# Patient Record
Sex: FEMALE | Race: WHITE | NOT HISPANIC OR LATINO
[De-identification: names, ages, dates, MRNs, and addresses within clinical notes are randomized per-mention and may not be internally consistent; named-entity substitution may affect disease eponyms.]

---

## 2017-12-22 ENCOUNTER — TRANSCRIPTION ENCOUNTER (OUTPATIENT)
Age: 27
End: 2017-12-22

## 2018-06-18 ENCOUNTER — TRANSCRIPTION ENCOUNTER (OUTPATIENT)
Age: 28
End: 2018-06-18

## 2019-03-11 ENCOUNTER — TRANSCRIPTION ENCOUNTER (OUTPATIENT)
Age: 29
End: 2019-03-11

## 2019-06-28 ENCOUNTER — TRANSCRIPTION ENCOUNTER (OUTPATIENT)
Age: 29
End: 2019-06-28

## 2019-07-10 ENCOUNTER — TRANSCRIPTION ENCOUNTER (OUTPATIENT)
Age: 29
End: 2019-07-10

## 2019-08-06 ENCOUNTER — APPOINTMENT (OUTPATIENT)
Dept: OBGYN | Facility: CLINIC | Age: 29
End: 2019-08-06
Payer: COMMERCIAL

## 2019-08-06 ENCOUNTER — RECORD ABSTRACTING (OUTPATIENT)
Age: 29
End: 2019-08-06

## 2019-08-06 VITALS
HEIGHT: 66 IN | WEIGHT: 157 LBS | SYSTOLIC BLOOD PRESSURE: 110 MMHG | BODY MASS INDEX: 25.23 KG/M2 | DIASTOLIC BLOOD PRESSURE: 68 MMHG

## 2019-08-06 DIAGNOSIS — F41.9 ANXIETY DISORDER, UNSPECIFIED: ICD-10-CM

## 2019-08-06 DIAGNOSIS — Z82.62 FAMILY HISTORY OF OSTEOPOROSIS: ICD-10-CM

## 2019-08-06 DIAGNOSIS — Z80.52 FAMILY HISTORY OF MALIGNANT NEOPLASM OF BLADDER: ICD-10-CM

## 2019-08-06 DIAGNOSIS — L98.9 DISORDER OF THE SKIN AND SUBCUTANEOUS TISSUE, UNSPECIFIED: ICD-10-CM

## 2019-08-06 DIAGNOSIS — Z00.00 ENCOUNTER FOR GENERAL ADULT MEDICAL EXAMINATION W/OUT ABNORMAL FINDINGS: ICD-10-CM

## 2019-08-06 DIAGNOSIS — F39 UNSPECIFIED MOOD [AFFECTIVE] DISORDER: ICD-10-CM

## 2019-08-06 DIAGNOSIS — Z87.42 PERSONAL HISTORY OF OTHER DISEASES OF THE FEMALE GENITAL TRACT: ICD-10-CM

## 2019-08-06 DIAGNOSIS — Z82.49 FAMILY HISTORY OF ISCHEMIC HEART DISEASE AND OTHER DISEASES OF THE CIRCULATORY SYSTEM: ICD-10-CM

## 2019-08-06 DIAGNOSIS — J45.909 UNSPECIFIED ASTHMA, UNCOMPLICATED: ICD-10-CM

## 2019-08-06 DIAGNOSIS — Z78.9 OTHER SPECIFIED HEALTH STATUS: ICD-10-CM

## 2019-08-06 DIAGNOSIS — Z30.09 ENCOUNTER FOR OTHER GENERAL COUNSELING AND ADVICE ON CONTRACEPTION: ICD-10-CM

## 2019-08-06 DIAGNOSIS — Z80.0 FAMILY HISTORY OF MALIGNANT NEOPLASM OF DIGESTIVE ORGANS: ICD-10-CM

## 2019-08-06 DIAGNOSIS — Z01.419 ENCOUNTER FOR GYNECOLOGICAL EXAMINATION (GENERAL) (ROUTINE) W/OUT ABNORMAL FINDINGS: ICD-10-CM

## 2019-08-06 LAB — CYTOLOGY CVX/VAG DOC THIN PREP: NORMAL

## 2019-08-06 PROCEDURE — 99395 PREV VISIT EST AGE 18-39: CPT

## 2019-08-06 RX ORDER — FEXOFENADINE HYDROCHLORIDE 180 MG/1
180 TABLET, FILM COATED ORAL
Refills: 0 | Status: DISCONTINUED | COMMUNITY
End: 2019-08-06

## 2019-08-06 RX ORDER — NORETHINDRONE ACETATE/ETHINYL ESTRADIOL AND FERROUS FUMARATE 1MG-20(21)
1-20 KIT ORAL
Refills: 0 | Status: DISCONTINUED | COMMUNITY
End: 2019-08-06

## 2019-08-10 LAB — CYTOLOGY CVX/VAG DOC THIN PREP: NORMAL

## 2019-08-12 NOTE — HISTORY OF PRESENT ILLNESS
[___ Year(s) Ago] : [unfilled] year(s) ago [Good] : being in good health [Healthy Diet] : a healthy diet [Regular Exercise] : regular exercise [Last Pap ___] : Last cervical pap smear was [unfilled] [Reproductive Age] : is of reproductive age [Contraception] : uses contraception [Up to Date] : up to date with ~his/her~ STD screening [Last Screen ___] : last STD screen was [unfilled] [Sexually Active] : is sexually active [Monogamous] : is monogamous [Male ___] : [unfilled] male [Oral Contraceptive] : uses oral contraception pills [Definite:  ___ (Date)] : the last menstrual period was [unfilled] [Normal Amount/Duration] : was of a normal amount and duration [Regular Cycle Intervals] : periods have been regular [Frequency: Q ___ days] : menstrual periods occur approximately every [unfilled] days [Menarche Age: ____] : age at menarche was [unfilled] [Prior Menses:  ___ Date] : date of prior menstruation was [unfilled] [Weight Concerns] : no concerns with her weight [Menstrual Problems] : reports normal menses [Pregnancy History] : denies prior pregnancies [de-identified] : ALEN 11/02/2017 NEG [Spotting Between  Menses] : no spotting between menses

## 2019-08-12 NOTE — DISCUSSION/SUMMARY
[Combined Oral Contraception] : combined oral contraception [Pregnancy Prevention] : pregnancy prevention [FreeTextEntry1] : The R/B/C of OBC's were reviewed. All the pt's questions and concerns were addressed.

## 2020-04-20 ENCOUNTER — RX RENEWAL (OUTPATIENT)
Age: 30
End: 2020-04-20

## 2020-12-21 PROBLEM — Z01.419 ENCOUNTER FOR ANNUAL ROUTINE GYNECOLOGICAL EXAMINATION: Status: RESOLVED | Noted: 2019-08-06 | Resolved: 2020-12-21

## 2021-12-28 ENCOUNTER — NON-APPOINTMENT (OUTPATIENT)
Age: 31
End: 2021-12-28

## 2022-01-04 ENCOUNTER — APPOINTMENT (OUTPATIENT)
Dept: OBGYN | Facility: CLINIC | Age: 32
End: 2022-01-04
Payer: COMMERCIAL

## 2022-01-04 VITALS
WEIGHT: 176 LBS | DIASTOLIC BLOOD PRESSURE: 78 MMHG | BODY MASS INDEX: 28.28 KG/M2 | SYSTOLIC BLOOD PRESSURE: 120 MMHG | HEIGHT: 66 IN

## 2022-01-04 PROCEDURE — 99213 OFFICE O/P EST LOW 20 MIN: CPT

## 2022-01-04 PROCEDURE — 81025 URINE PREGNANCY TEST: CPT

## 2022-01-04 PROCEDURE — 36415 COLL VENOUS BLD VENIPUNCTURE: CPT

## 2022-01-04 RX ORDER — LEVONORGESTREL AND ETHINYL ESTRADIOL 0.1-0.02MG
0.1-2 KIT ORAL
Qty: 3 | Refills: 3 | Status: DISCONTINUED | COMMUNITY
Start: 2019-08-06 | End: 2022-01-04

## 2022-01-04 RX ORDER — LEVONORGESTREL AND ETHINYL ESTRADIOL 0.1-0.02MG
0.1-2 KIT ORAL
Qty: 84 | Refills: 1 | Status: DISCONTINUED | COMMUNITY
Start: 2020-04-20 | End: 2022-01-04

## 2022-01-04 RX ORDER — LEVONORGESTREL AND ETHINYL ESTRADIOL 0.1-0.02MG
0.1-2 KIT ORAL
Refills: 0 | Status: DISCONTINUED | COMMUNITY
End: 2022-01-04

## 2022-01-04 NOTE — PLAN
[FreeTextEntry1] : Advised the patient to have an hCG today and return for pelvic sonogram and annual exam- no ultrasound on site tonight\par Importance of trending hCG to negative reviewed with patient\par Warning signs for immediate attention reviewed\par Bleeding call parameters reviewed\par All questions answered\par Patient advised on the benefits of waiting 1 cycle after MAB prior to attempting conception\par Prenatal vitamin benefits 6 weeks preconception also reviewed

## 2022-01-04 NOTE — HISTORY OF PRESENT ILLNESS
[N] : Patient does not use contraception [Y] : Positive pregnancy history [Menarche Age: ____] : age at menarche was [unfilled] [No] : Patient does not have concerns regarding sex [PapSmeardate] : 8/6/19 [TextBox_31] : NEG [LMPDate] : 11/9/21 [PGxTotal] : 1 [FreeTextEntry1] : 11/9/21

## 2022-01-05 ENCOUNTER — NON-APPOINTMENT (OUTPATIENT)
Age: 32
End: 2022-01-05

## 2022-01-06 ENCOUNTER — TRANSCRIPTION ENCOUNTER (OUTPATIENT)
Age: 32
End: 2022-01-06

## 2022-01-06 ENCOUNTER — APPOINTMENT (OUTPATIENT)
Dept: OBGYN | Facility: CLINIC | Age: 32
End: 2022-01-06
Payer: COMMERCIAL

## 2022-01-06 ENCOUNTER — ASOB RESULT (OUTPATIENT)
Age: 32
End: 2022-01-06

## 2022-01-06 ENCOUNTER — APPOINTMENT (OUTPATIENT)
Dept: ANTEPARTUM | Facility: CLINIC | Age: 32
End: 2022-01-06
Payer: COMMERCIAL

## 2022-01-06 VITALS
BODY MASS INDEX: 27.8 KG/M2 | WEIGHT: 173 LBS | HEIGHT: 66 IN | DIASTOLIC BLOOD PRESSURE: 66 MMHG | SYSTOLIC BLOOD PRESSURE: 124 MMHG

## 2022-01-06 DIAGNOSIS — O20.9 HEMORRHAGE IN EARLY PREGNANCY, UNSPECIFIED: ICD-10-CM

## 2022-01-06 PROCEDURE — 99213 OFFICE O/P EST LOW 20 MIN: CPT

## 2022-01-06 PROCEDURE — 76817 TRANSVAGINAL US OBSTETRIC: CPT

## 2022-01-08 LAB
ABO + RH PNL BLD: NORMAL
BLD GP AB SCN SERPL QL: NORMAL
HCG SERPL-MCNC: ABNORMAL MIU/ML
HCG UR QL: POSITIVE
HCG-TM SERPL-MCNC: ABNORMAL MIU/ML
QUALITY CONTROL: YES

## 2022-01-09 NOTE — PHYSICAL EXAM
[Chaperone Present] : A chaperone was present in the examining room during all aspects of the physical examination [Appropriately responsive] : appropriately responsive [Alert] : alert [No Acute Distress] : no acute distress [Oriented x3] : oriented x3 [Labia Majora] : normal [Labia Minora] : normal [Bartholin's Gland] : the left Bartholin's gland was normal [Scant] : There was scant vaginal bleeding [Normal] : normal [Uterine Adnexae] : normal [FreeTextEntry1] : DONAL Wyatt [FreeTextEntry4] : Brown vaginal blood seen.

## 2022-01-09 NOTE — DISCUSSION/SUMMARY
[FreeTextEntry1] : -Confirmation of pregnancy today- LMP: 11/9/21. EDC by today: 8/16/22.\par 1) Pelvic sono today: CRL 8 1/7 wks.  bpm. CL 2.84 cm. Retroverted uterus with a normal GS. A bleed noted right lateral to GS measuring 3.2 cm. Left ovary WNL. Right ovary corpus luteum cyst 2.7 cm. Cervix appears long and closed. Results were discussed.\par 2) Repeat pelvic sonogram ordered. \par 3) Beta- HCG on 1/4/22: 17029. \par 4) Call parameters were discussed.\par \par -She will follow up in 1 week with pelvic sonogram or as needed.

## 2022-01-09 NOTE — HISTORY OF PRESENT ILLNESS
[PapSmeardate] : 8/6/19 [TextBox_31] : NEG [PGHxTotal] : 2 [LMPDate] : 11/9/21 [[facility]] : [facility] [[facility]] : 1 [FreeTextEntry1] : 11/9/21

## 2022-01-09 NOTE — END OF VISIT
[FreeTextEntry3] : I, Sadeshaan Murilloador solely acted as a scribe for Dr. Gissel Recinos on 01/06/2022 . All medical entries made by the scribe were at my, Dr. Recinos's, direction and personally dictated by me on 01/06/2022 . I have reviewed the chart and agree that the record accurately reflects my personal performance of the history, physical exam, assessment and plan. I have also personally directed, reviewed, and agreed with the chart.

## 2022-01-12 ENCOUNTER — APPOINTMENT (OUTPATIENT)
Dept: OBGYN | Facility: CLINIC | Age: 32
End: 2022-01-12
Payer: COMMERCIAL

## 2022-01-12 ENCOUNTER — APPOINTMENT (OUTPATIENT)
Dept: ANTEPARTUM | Facility: CLINIC | Age: 32
End: 2022-01-12
Payer: COMMERCIAL

## 2022-01-12 ENCOUNTER — ASOB RESULT (OUTPATIENT)
Age: 32
End: 2022-01-12

## 2022-01-12 VITALS
HEIGHT: 66 IN | SYSTOLIC BLOOD PRESSURE: 118 MMHG | WEIGHT: 172 LBS | DIASTOLIC BLOOD PRESSURE: 70 MMHG | BODY MASS INDEX: 27.64 KG/M2

## 2022-01-12 DIAGNOSIS — Z34.90 ENCOUNTER FOR SUPERVISION OF NORMAL PREGNANCY, UNSPECIFIED, UNSPECIFIED TRIMESTER: ICD-10-CM

## 2022-01-12 DIAGNOSIS — Z12.4 ENCOUNTER FOR SCREENING FOR MALIGNANT NEOPLASM OF CERVIX: ICD-10-CM

## 2022-01-12 DIAGNOSIS — Z32.01 ENCOUNTER FOR PREGNANCY TEST, RESULT POSITIVE: ICD-10-CM

## 2022-01-12 LAB
BILIRUB UR QL STRIP: NORMAL
GLUCOSE UR-MCNC: NORMAL
HCG UR QL: 0.2 EU/DL
HGB UR QL STRIP.AUTO: NORMAL
KETONES UR-MCNC: NORMAL
LEUKOCYTE ESTERASE UR QL STRIP: NORMAL
NITRITE UR QL STRIP: NORMAL
PH UR STRIP: 6
PROT UR STRIP-MCNC: NORMAL
SP GR UR STRIP: 1.01

## 2022-01-12 PROCEDURE — 0500F INITIAL PRENATAL CARE VISIT: CPT

## 2022-01-12 PROCEDURE — 76817 TRANSVAGINAL US OBSTETRIC: CPT

## 2022-01-12 PROCEDURE — 36415 COLL VENOUS BLD VENIPUNCTURE: CPT

## 2022-01-13 ENCOUNTER — APPOINTMENT (OUTPATIENT)
Dept: OBGYN | Facility: CLINIC | Age: 32
End: 2022-01-13

## 2022-01-13 ENCOUNTER — APPOINTMENT (OUTPATIENT)
Dept: ANTEPARTUM | Facility: CLINIC | Age: 32
End: 2022-01-13

## 2022-01-17 ENCOUNTER — NON-APPOINTMENT (OUTPATIENT)
Age: 32
End: 2022-01-17

## 2022-01-18 LAB
ABO + RH PNL BLD: NORMAL
ALBUMIN SERPL ELPH-MCNC: 4.6 G/DL
ALP BLD-CCNC: 57 U/L
ALT SERPL-CCNC: 73 U/L
AST SERPL-CCNC: 37 U/L
B19V IGG SER QL IA: 0.27 INDEX
B19V IGG+IGM SER-IMP: NEGATIVE
B19V IGG+IGM SER-IMP: NORMAL
B19V IGM FLD-ACNC: 0.11 INDEX
B19V IGM SER-ACNC: NEGATIVE
BASOPHILS # BLD AUTO: 0.06 K/UL
BASOPHILS NFR BLD AUTO: 0.6 %
BILIRUB DIRECT SERPL-MCNC: 0.2 MG/DL
BILIRUB INDIRECT SERPL-MCNC: 0.5 MG/DL
BILIRUB SERPL-MCNC: 0.7 MG/DL
BLD GP AB SCN SERPL QL: NORMAL
C TRACH RRNA SPEC QL NAA+PROBE: NOT DETECTED
CMV IGG SERPL QL: 0.41 U/ML
CMV IGG SERPL-IMP: NEGATIVE
CMV IGM SERPL QL: <8 AU/ML
CMV IGM SERPL QL: NEGATIVE
CYTOLOGY CVX/VAG DOC THIN PREP: ABNORMAL
EOSINOPHIL # BLD AUTO: 0.25 K/UL
EOSINOPHIL NFR BLD AUTO: 2.5 %
ESTIMATED AVERAGE GLUCOSE: 105 MG/DL
FERRITIN SERPL-MCNC: 108 NG/ML
FOLATE SERPL-MCNC: 14.6 NG/ML
HBA1C MFR BLD HPLC: 5.3 %
HBV SURFACE AG SER QL: NONREACTIVE
HCT VFR BLD CALC: 37.5 %
HGB A MFR BLD: 97.4 %
HGB A2 MFR BLD: 2.6 %
HGB BLD-MCNC: 12.1 G/DL
HGB FRACT BLD-IMP: NORMAL
HIV1+2 AB SPEC QL IA.RAPID: NONREACTIVE
IMM GRANULOCYTES NFR BLD AUTO: 0.2 %
IRON SATN MFR SERPL: 39 %
IRON SERPL-MCNC: 126 UG/DL
LYMPHOCYTES # BLD AUTO: 2.15 K/UL
LYMPHOCYTES NFR BLD AUTO: 21.8 %
M TB IFN-G BLD-IMP: NEGATIVE
MAN DIFF?: NORMAL
MCHC RBC-ENTMCNC: 29.2 PG
MCHC RBC-ENTMCNC: 32.3 GM/DL
MCV RBC AUTO: 90.4 FL
MEV IGG FLD QL IA: 91.2 AU/ML
MEV IGG+IGM SER-IMP: POSITIVE
MONOCYTES # BLD AUTO: 0.79 K/UL
MONOCYTES NFR BLD AUTO: 8 %
N GONORRHOEA RRNA SPEC QL NAA+PROBE: NOT DETECTED
NEUTROPHILS # BLD AUTO: 6.6 K/UL
NEUTROPHILS NFR BLD AUTO: 66.9 %
PLATELET # BLD AUTO: 359 K/UL
PROT SERPL-MCNC: 7 G/DL
QUANTIFERON TB PLUS MITOGEN MINUS NIL: 9.85 IU/ML
QUANTIFERON TB PLUS NIL: 0.01 IU/ML
QUANTIFERON TB PLUS TB1 MINUS NIL: 0 IU/ML
QUANTIFERON TB PLUS TB2 MINUS NIL: 0 IU/ML
RBC # BLD: 4.15 M/UL
RBC # BLD: 4.15 M/UL
RBC # FLD: 12.4 %
RETICS # AUTO: 1.6 %
RETICS AGGREG/RBC NFR: 66.4 K/UL
RUBV IGG FLD-ACNC: 31.5 INDEX
RUBV IGG SER-IMP: POSITIVE
SOURCE TP AMPLIFICATION: NORMAL
T GONDII AB SER-IMP: NEGATIVE
T GONDII IGM SER QL: <3 AU/ML
T PALLIDUM AB SER QL IA: NEGATIVE
TIBC SERPL-MCNC: 326 UG/DL
TSH SERPL-ACNC: 0.54 UIU/ML
UIBC SERPL-MCNC: 200 UG/DL
VIT B12 SERPL-MCNC: 696 PG/ML
WBC # FLD AUTO: 9.87 K/UL

## 2022-01-19 ENCOUNTER — NON-APPOINTMENT (OUTPATIENT)
Age: 32
End: 2022-01-19

## 2022-01-27 ENCOUNTER — APPOINTMENT (OUTPATIENT)
Dept: OBGYN | Facility: CLINIC | Age: 32
End: 2022-01-27

## 2022-02-02 ENCOUNTER — APPOINTMENT (OUTPATIENT)
Dept: OBGYN | Facility: CLINIC | Age: 32
End: 2022-02-02
Payer: COMMERCIAL

## 2022-02-02 ENCOUNTER — NON-APPOINTMENT (OUTPATIENT)
Age: 32
End: 2022-02-02

## 2022-02-02 ENCOUNTER — ASOB RESULT (OUTPATIENT)
Age: 32
End: 2022-02-02

## 2022-02-02 ENCOUNTER — APPOINTMENT (OUTPATIENT)
Dept: ANTEPARTUM | Facility: CLINIC | Age: 32
End: 2022-02-02
Payer: COMMERCIAL

## 2022-02-02 VITALS
SYSTOLIC BLOOD PRESSURE: 126 MMHG | HEIGHT: 66 IN | WEIGHT: 175 LBS | DIASTOLIC BLOOD PRESSURE: 76 MMHG | BODY MASS INDEX: 28.12 KG/M2

## 2022-02-02 DIAGNOSIS — O41.8X10 OTHER SPECIFIED DISORDERS OF AMNIOTIC FLUID AND MEMBRANES, FIRST TRIMESTER, NOT APPLICABLE OR UNSPECIFIED: ICD-10-CM

## 2022-02-02 DIAGNOSIS — O46.8X1 OTHER SPECIFIED DISORDERS OF AMNIOTIC FLUID AND MEMBRANES, FIRST TRIMESTER, NOT APPLICABLE OR UNSPECIFIED: ICD-10-CM

## 2022-02-02 DIAGNOSIS — O34.539 MATERNAL CARE FOR RETROVERSION OF GRAVID UTERUS, UNSPECIFIED TRIMESTER: ICD-10-CM

## 2022-02-02 PROCEDURE — 76801 OB US < 14 WKS SINGLE FETUS: CPT

## 2022-02-02 PROCEDURE — 0502F SUBSEQUENT PRENATAL CARE: CPT

## 2022-02-09 ENCOUNTER — ASOB RESULT (OUTPATIENT)
Age: 32
End: 2022-02-09

## 2022-02-09 ENCOUNTER — APPOINTMENT (OUTPATIENT)
Dept: OBGYN | Facility: CLINIC | Age: 32
End: 2022-02-09
Payer: COMMERCIAL

## 2022-02-09 ENCOUNTER — APPOINTMENT (OUTPATIENT)
Dept: ANTEPARTUM | Facility: CLINIC | Age: 32
End: 2022-02-09
Payer: COMMERCIAL

## 2022-02-09 VITALS
SYSTOLIC BLOOD PRESSURE: 102 MMHG | WEIGHT: 174 LBS | DIASTOLIC BLOOD PRESSURE: 62 MMHG | BODY MASS INDEX: 27.97 KG/M2 | HEIGHT: 66 IN

## 2022-02-09 DIAGNOSIS — Z34.91 ENCOUNTER FOR SUPERVISION OF NORMAL PREGNANCY, UNSPECIFIED, FIRST TRIMESTER: ICD-10-CM

## 2022-02-09 LAB
BILIRUB UR QL STRIP: NORMAL
COLLECTION METHOD: NORMAL
GLUCOSE UR-MCNC: NORMAL
HCG UR QL: 0.2 EU/DL
HGB UR QL STRIP.AUTO: NORMAL
KETONES UR-MCNC: NORMAL
LEUKOCYTE ESTERASE UR QL STRIP: ABNORMAL
NITRITE UR QL STRIP: NORMAL
PH UR STRIP: 6
PROT UR STRIP-MCNC: NORMAL
SP GR UR STRIP: 1

## 2022-02-09 PROCEDURE — 76813 OB US NUCHAL MEAS 1 GEST: CPT

## 2022-02-09 PROCEDURE — 0502F SUBSEQUENT PRENATAL CARE: CPT

## 2022-02-10 PROBLEM — Z34.91 FIRST TRIMESTER PREGNANCY: Status: ACTIVE | Noted: 2022-02-02

## 2022-02-11 ENCOUNTER — NON-APPOINTMENT (OUTPATIENT)
Age: 32
End: 2022-02-11

## 2022-02-14 ENCOUNTER — NON-APPOINTMENT (OUTPATIENT)
Age: 32
End: 2022-02-14

## 2022-02-14 LAB
CLARI ADDITIONAL INFO: NORMAL
CLARI CHROMOSOME 13: NORMAL
CLARI CHROMOSOME 18: NORMAL
CLARI CHROMOSOME 21: NORMAL
CLARI SEX CHROMOSOMES: NORMAL
CLARITEST NIPT: NORMAL
MATERNAL WEIGHT (LBS):: 175
PLEASE INCLUDE GENDER RESULTS ON THIS REPORT:: NORMAL
TYPE OF PREGNANCY:: NORMAL

## 2022-02-15 ENCOUNTER — NON-APPOINTMENT (OUTPATIENT)
Age: 32
End: 2022-02-15

## 2022-02-27 LAB
AR GENE MUT ANL BLD/T: NORMAL
CFTR MUT TESTED BLD/T: NEGATIVE
FMR1 GENE MUT ANL BLD/T: NORMAL

## 2022-03-02 ENCOUNTER — APPOINTMENT (OUTPATIENT)
Dept: OBGYN | Facility: CLINIC | Age: 32
End: 2022-03-02
Payer: COMMERCIAL

## 2022-03-02 VITALS
SYSTOLIC BLOOD PRESSURE: 128 MMHG | HEIGHT: 66 IN | WEIGHT: 173 LBS | DIASTOLIC BLOOD PRESSURE: 80 MMHG | BODY MASS INDEX: 27.8 KG/M2

## 2022-03-02 DIAGNOSIS — R74.8 ABNORMAL LEVELS OF OTHER SERUM ENZYMES: ICD-10-CM

## 2022-03-02 LAB
BILIRUB UR QL STRIP: NORMAL
GLUCOSE UR-MCNC: NORMAL
HCG UR QL: 0.2 EU/DL
HGB UR QL STRIP.AUTO: NORMAL
KETONES UR-MCNC: NORMAL
LEUKOCYTE ESTERASE UR QL STRIP: ABNORMAL
NITRITE UR QL STRIP: NORMAL
PH UR STRIP: 7
PROT UR STRIP-MCNC: NORMAL
SP GR UR STRIP: 1.01

## 2022-03-02 PROCEDURE — 36415 COLL VENOUS BLD VENIPUNCTURE: CPT

## 2022-03-02 PROCEDURE — 0502F SUBSEQUENT PRENATAL CARE: CPT

## 2022-03-03 ENCOUNTER — NON-APPOINTMENT (OUTPATIENT)
Age: 32
End: 2022-03-03

## 2022-03-03 LAB
ALBUMIN SERPL ELPH-MCNC: 3.9 G/DL
ALP BLD-CCNC: 48 U/L
ALT SERPL-CCNC: 13 U/L
ANION GAP SERPL CALC-SCNC: 15 MMOL/L
AST SERPL-CCNC: 19 U/L
BILIRUB SERPL-MCNC: 0.3 MG/DL
BUN SERPL-MCNC: 6 MG/DL
CALCIUM SERPL-MCNC: 9.4 MG/DL
CHLORIDE SERPL-SCNC: 100 MMOL/L
CO2 SERPL-SCNC: 20 MMOL/L
CREAT SERPL-MCNC: 0.46 MG/DL
EGFR: 132 ML/MIN/1.73M2
GLUCOSE SERPL-MCNC: 51 MG/DL
POTASSIUM SERPL-SCNC: 4.5 MMOL/L
PROT SERPL-MCNC: 6.6 G/DL
SODIUM SERPL-SCNC: 136 MMOL/L

## 2022-03-06 LAB
2ND TRIMESTER DATA: NORMAL
AFP PNL SERPL: NORMAL
AFP SERPL-ACNC: NORMAL
CLINICAL BIOCHEMIST REVIEW: NORMAL
NOTES NTD: NORMAL

## 2022-03-28 ENCOUNTER — NON-APPOINTMENT (OUTPATIENT)
Age: 32
End: 2022-03-28

## 2022-03-28 ENCOUNTER — APPOINTMENT (OUTPATIENT)
Dept: ANTEPARTUM | Facility: CLINIC | Age: 32
End: 2022-03-28
Payer: COMMERCIAL

## 2022-03-28 ENCOUNTER — ASOB RESULT (OUTPATIENT)
Age: 32
End: 2022-03-28

## 2022-03-28 PROCEDURE — 76817 TRANSVAGINAL US OBSTETRIC: CPT

## 2022-03-28 PROCEDURE — 76805 OB US >/= 14 WKS SNGL FETUS: CPT

## 2022-03-30 ENCOUNTER — TRANSCRIPTION ENCOUNTER (OUTPATIENT)
Age: 32
End: 2022-03-30

## 2022-03-30 ENCOUNTER — NON-APPOINTMENT (OUTPATIENT)
Age: 32
End: 2022-03-30

## 2022-04-14 ENCOUNTER — NON-APPOINTMENT (OUTPATIENT)
Age: 32
End: 2022-04-14

## 2022-04-14 ENCOUNTER — APPOINTMENT (OUTPATIENT)
Dept: OBGYN | Facility: CLINIC | Age: 32
End: 2022-04-14
Payer: COMMERCIAL

## 2022-04-14 VITALS
WEIGHT: 180 LBS | SYSTOLIC BLOOD PRESSURE: 120 MMHG | HEIGHT: 66 IN | DIASTOLIC BLOOD PRESSURE: 70 MMHG | BODY MASS INDEX: 28.93 KG/M2

## 2022-04-14 LAB
BILIRUB UR QL STRIP: NORMAL
GLUCOSE UR-MCNC: NORMAL
HCG UR QL: 0.2 EU/DL
HGB UR QL STRIP.AUTO: NORMAL
KETONES UR-MCNC: NORMAL
LEUKOCYTE ESTERASE UR QL STRIP: ABNORMAL
NITRITE UR QL STRIP: NORMAL
PH UR STRIP: 6
PROT UR STRIP-MCNC: NORMAL
SP GR UR STRIP: 1.01

## 2022-04-14 PROCEDURE — 0502F SUBSEQUENT PRENATAL CARE: CPT

## 2022-05-18 ENCOUNTER — APPOINTMENT (OUTPATIENT)
Dept: OBGYN | Facility: CLINIC | Age: 32
End: 2022-05-18
Payer: COMMERCIAL

## 2022-05-18 VITALS
WEIGHT: 187 LBS | DIASTOLIC BLOOD PRESSURE: 78 MMHG | HEIGHT: 66 IN | BODY MASS INDEX: 30.05 KG/M2 | SYSTOLIC BLOOD PRESSURE: 122 MMHG

## 2022-05-18 DIAGNOSIS — Z34.92 ENCOUNTER FOR SUPERVISION OF NORMAL PREGNANCY, UNSPECIFIED, SECOND TRIMESTER: ICD-10-CM

## 2022-05-18 PROCEDURE — 0502F SUBSEQUENT PRENATAL CARE: CPT

## 2022-05-18 PROCEDURE — 36415 COLL VENOUS BLD VENIPUNCTURE: CPT

## 2022-05-20 LAB
BASOPHILS # BLD AUTO: 0.07 K/UL
BASOPHILS NFR BLD AUTO: 0.6 %
EOSINOPHIL # BLD AUTO: 0.5 K/UL
EOSINOPHIL NFR BLD AUTO: 4.5 %
GLUCOSE 1H P 50 G GLC PO SERPL-MCNC: 129 MG/DL
HCT VFR BLD CALC: 36.7 %
HGB BLD-MCNC: 11.6 G/DL
IMM GRANULOCYTES NFR BLD AUTO: 0.3 %
LYMPHOCYTES # BLD AUTO: 1.84 K/UL
LYMPHOCYTES NFR BLD AUTO: 16.5 %
MAN DIFF?: NORMAL
MCHC RBC-ENTMCNC: 30 PG
MCHC RBC-ENTMCNC: 31.6 GM/DL
MCV RBC AUTO: 94.8 FL
MONOCYTES # BLD AUTO: 0.68 K/UL
MONOCYTES NFR BLD AUTO: 6.1 %
NEUTROPHILS # BLD AUTO: 8.04 K/UL
NEUTROPHILS NFR BLD AUTO: 72 %
PLATELET # BLD AUTO: 293 K/UL
RBC # BLD: 3.87 M/UL
RBC # FLD: 12.5 %
WBC # FLD AUTO: 11.16 K/UL

## 2022-06-01 ENCOUNTER — APPOINTMENT (OUTPATIENT)
Dept: OBGYN | Facility: CLINIC | Age: 32
End: 2022-06-01
Payer: COMMERCIAL

## 2022-06-01 VITALS
SYSTOLIC BLOOD PRESSURE: 122 MMHG | DIASTOLIC BLOOD PRESSURE: 80 MMHG | WEIGHT: 191.13 LBS | HEIGHT: 66 IN | BODY MASS INDEX: 30.72 KG/M2

## 2022-06-01 LAB
BILIRUB UR QL STRIP: NORMAL
COLLECTION METHOD: NORMAL
GLUCOSE UR-MCNC: NORMAL
HCG UR QL: 0.2 EU/DL
HGB UR QL STRIP.AUTO: NORMAL
KETONES UR-MCNC: NORMAL
LEUKOCYTE ESTERASE UR QL STRIP: ABNORMAL
NITRITE UR QL STRIP: NORMAL
PH UR STRIP: 7
PROT UR STRIP-MCNC: NORMAL
SP GR UR STRIP: 1.01

## 2022-06-01 PROCEDURE — 0502F SUBSEQUENT PRENATAL CARE: CPT

## 2022-06-17 ENCOUNTER — APPOINTMENT (OUTPATIENT)
Dept: OBGYN | Facility: CLINIC | Age: 32
End: 2022-06-17
Payer: COMMERCIAL

## 2022-06-17 VITALS
HEIGHT: 66 IN | DIASTOLIC BLOOD PRESSURE: 70 MMHG | WEIGHT: 191 LBS | SYSTOLIC BLOOD PRESSURE: 120 MMHG | BODY MASS INDEX: 30.7 KG/M2

## 2022-06-17 LAB
BILIRUB UR QL STRIP: NORMAL
GLUCOSE UR-MCNC: NORMAL
HCG UR QL: 0.2 EU/DL
HGB UR QL STRIP.AUTO: NORMAL
KETONES UR-MCNC: NORMAL
LEUKOCYTE ESTERASE UR QL STRIP: ABNORMAL
NITRITE UR QL STRIP: NORMAL
PH UR STRIP: 6.5
PROT UR STRIP-MCNC: NORMAL
SP GR UR STRIP: 1.01

## 2022-06-17 PROCEDURE — 0502F SUBSEQUENT PRENATAL CARE: CPT

## 2022-06-22 ENCOUNTER — ASOB RESULT (OUTPATIENT)
Age: 32
End: 2022-06-22

## 2022-06-22 ENCOUNTER — APPOINTMENT (OUTPATIENT)
Dept: ANTEPARTUM | Facility: CLINIC | Age: 32
End: 2022-06-22
Payer: COMMERCIAL

## 2022-06-22 PROCEDURE — 76819 FETAL BIOPHYS PROFIL W/O NST: CPT

## 2022-06-22 PROCEDURE — 76816 OB US FOLLOW-UP PER FETUS: CPT

## 2022-07-05 ENCOUNTER — NON-APPOINTMENT (OUTPATIENT)
Age: 32
End: 2022-07-05

## 2022-07-05 ENCOUNTER — APPOINTMENT (OUTPATIENT)
Dept: OBGYN | Facility: CLINIC | Age: 32
End: 2022-07-05

## 2022-07-05 VITALS
WEIGHT: 197 LBS | HEIGHT: 66 IN | SYSTOLIC BLOOD PRESSURE: 120 MMHG | DIASTOLIC BLOOD PRESSURE: 68 MMHG | BODY MASS INDEX: 31.66 KG/M2

## 2022-07-05 DIAGNOSIS — Z23 ENCOUNTER FOR IMMUNIZATION: ICD-10-CM

## 2022-07-05 LAB
BILIRUB UR QL STRIP: NORMAL
GLUCOSE UR-MCNC: NORMAL
HCG UR QL: 0.2 EU/DL
HGB UR QL STRIP.AUTO: NORMAL
KETONES UR-MCNC: NORMAL
LEUKOCYTE ESTERASE UR QL STRIP: ABNORMAL
NITRITE UR QL STRIP: NORMAL
PH UR STRIP: 6
PROT UR STRIP-MCNC: NORMAL
SP GR UR STRIP: >=1.03

## 2022-07-05 PROCEDURE — 0502F SUBSEQUENT PRENATAL CARE: CPT

## 2022-07-05 PROCEDURE — 90715 TDAP VACCINE 7 YRS/> IM: CPT

## 2022-07-05 PROCEDURE — 90471 IMMUNIZATION ADMIN: CPT

## 2022-07-21 ENCOUNTER — APPOINTMENT (OUTPATIENT)
Dept: OBGYN | Facility: CLINIC | Age: 32
End: 2022-07-21

## 2022-07-21 VITALS
HEIGHT: 66 IN | SYSTOLIC BLOOD PRESSURE: 124 MMHG | DIASTOLIC BLOOD PRESSURE: 80 MMHG | BODY MASS INDEX: 32.14 KG/M2 | WEIGHT: 200 LBS

## 2022-07-21 DIAGNOSIS — R35.0 FREQUENCY OF MICTURITION: ICD-10-CM

## 2022-07-21 DIAGNOSIS — B37.2 CANDIDIASIS OF SKIN AND NAIL: ICD-10-CM

## 2022-07-21 LAB
BILIRUB UR QL STRIP: NORMAL
GLUCOSE UR-MCNC: NORMAL
HCG UR QL: 0.2 EU/DL
HGB UR QL STRIP.AUTO: ABNORMAL
KETONES UR-MCNC: NORMAL
LEUKOCYTE ESTERASE UR QL STRIP: ABNORMAL
NITRITE UR QL STRIP: NORMAL
PH UR STRIP: 7
PROT UR STRIP-MCNC: NORMAL
SP GR UR STRIP: 1.01

## 2022-07-21 PROCEDURE — 0502F SUBSEQUENT PRENATAL CARE: CPT

## 2022-07-21 PROCEDURE — 36415 COLL VENOUS BLD VENIPUNCTURE: CPT

## 2022-07-21 PROCEDURE — 99213 OFFICE O/P EST LOW 20 MIN: CPT | Mod: 25

## 2022-07-21 RX ORDER — CLOTRIMAZOLE 10 MG/G
1 CREAM TOPICAL
Qty: 1 | Refills: 0 | Status: ACTIVE | COMMUNITY
Start: 2022-07-21 | End: 1900-01-01

## 2022-07-25 ENCOUNTER — NON-APPOINTMENT (OUTPATIENT)
Age: 32
End: 2022-07-25

## 2022-07-25 ENCOUNTER — APPOINTMENT (OUTPATIENT)
Dept: PEDIATRIC CARDIOLOGY | Facility: CLINIC | Age: 32
End: 2022-07-25

## 2022-07-25 DIAGNOSIS — O09.299 SUPERVISION OF PREGNANCY WITH OTHER POOR REPRODUCTIVE OR OBSTETRIC HISTORY, UNSPECIFIED TRIMESTER: ICD-10-CM

## 2022-07-25 DIAGNOSIS — R01.1 CARDIAC MURMUR, UNSPECIFIED: ICD-10-CM

## 2022-07-25 DIAGNOSIS — O35.2XX0 MATERNAL CARE FOR (SUSPECTED) HEREDITARY DISEASE IN FETUS, NOT APPLICABLE OR UNSPECIFIED: ICD-10-CM

## 2022-07-25 DIAGNOSIS — O35.9XX0 MATERNAL CARE FOR (SUSPECTED) FETAL ABNORMALITY AND DAMAGE, UNSPECIFIED, NOT APPLICABLE OR UNSPECIFIED: ICD-10-CM

## 2022-07-25 DIAGNOSIS — Z3A.36 36 WEEKS GESTATION OF PREGNANCY: ICD-10-CM

## 2022-07-25 PROCEDURE — 76821 MIDDLE CEREBRAL ARTERY ECHO: CPT

## 2022-07-25 PROCEDURE — 76820 UMBILICAL ARTERY ECHO: CPT

## 2022-07-25 PROCEDURE — 93325 DOPPLER ECHO COLOR FLOW MAPG: CPT | Mod: 59

## 2022-07-25 PROCEDURE — 76827 ECHO EXAM OF FETAL HEART: CPT

## 2022-07-25 PROCEDURE — 99204 OFFICE O/P NEW MOD 45 MIN: CPT | Mod: 25

## 2022-07-25 PROCEDURE — 76825 ECHO EXAM OF FETAL HEART: CPT

## 2022-07-31 ENCOUNTER — TRANSCRIPTION ENCOUNTER (OUTPATIENT)
Age: 32
End: 2022-07-31

## 2022-07-31 ENCOUNTER — NON-APPOINTMENT (OUTPATIENT)
Age: 32
End: 2022-07-31

## 2022-07-31 DIAGNOSIS — B95.1 STREPTOCOCCUS, GROUP B, AS THE CAUSE OF DISEASES CLASSIFIED ELSEWHERE: ICD-10-CM

## 2022-07-31 LAB
APPEARANCE: ABNORMAL
B-HEM STREP SPEC QL CULT: ABNORMAL
BACTERIA UR CULT: NORMAL
BACTERIA: ABNORMAL
BILIRUBIN URINE: NEGATIVE
BLOOD URINE: NEGATIVE
CANDIDA VAG CYTO: DETECTED
COLOR: NORMAL
G VAGINALIS+PREV SP MTYP VAG QL MICRO: DETECTED
GLUCOSE QUALITATIVE U: NEGATIVE
HIV1+2 AB SPEC QL IA.RAPID: NONREACTIVE
HYALINE CASTS: 0 /LPF
KETONES URINE: NEGATIVE
LEUKOCYTE ESTERASE URINE: ABNORMAL
MICROSCOPIC-UA: NORMAL
NITRITE URINE: NEGATIVE
PH URINE: 7.5
PROTEIN URINE: NEGATIVE
RED BLOOD CELLS URINE: 0 /HPF
SPECIFIC GRAVITY URINE: 1.01
SQUAMOUS EPITHELIAL CELLS: >27 /HPF
T PALLIDUM AB SER QL IA: NEGATIVE
T VAGINALIS VAG QL WET PREP: NOT DETECTED
URINE COMMENTS: NORMAL
UROBILINOGEN URINE: NORMAL
WHITE BLOOD CELLS URINE: 20 /HPF

## 2022-07-31 RX ORDER — FLUCONAZOLE 150 MG/1
150 TABLET ORAL
Qty: 2 | Refills: 0 | Status: ACTIVE | COMMUNITY
Start: 2022-07-25

## 2022-07-31 RX ORDER — METRONIDAZOLE 7.5 MG/G
0.75 GEL VAGINAL
Qty: 1 | Refills: 0 | Status: ACTIVE | COMMUNITY
Start: 2022-07-25

## 2022-08-01 ENCOUNTER — NON-APPOINTMENT (OUTPATIENT)
Age: 32
End: 2022-08-01

## 2022-08-01 ENCOUNTER — APPOINTMENT (OUTPATIENT)
Dept: OBGYN | Facility: CLINIC | Age: 32
End: 2022-08-01

## 2022-08-01 ENCOUNTER — TRANSCRIPTION ENCOUNTER (OUTPATIENT)
Age: 32
End: 2022-08-01

## 2022-08-01 VITALS
BODY MASS INDEX: 32.3 KG/M2 | DIASTOLIC BLOOD PRESSURE: 62 MMHG | SYSTOLIC BLOOD PRESSURE: 114 MMHG | HEIGHT: 66 IN | WEIGHT: 201 LBS

## 2022-08-01 PROCEDURE — 0502F SUBSEQUENT PRENATAL CARE: CPT

## 2022-08-02 LAB
BILIRUB UR QL STRIP: NORMAL
GLUCOSE UR-MCNC: NORMAL
HCG UR QL: 0.2 EU/DL
HGB UR QL STRIP.AUTO: NORMAL
KETONES UR-MCNC: NORMAL
LEUKOCYTE ESTERASE UR QL STRIP: ABNORMAL
NITRITE UR QL STRIP: NORMAL
PH UR STRIP: 6.5
PROT UR STRIP-MCNC: NORMAL
SP GR UR STRIP: 1.02

## 2022-08-04 ENCOUNTER — NON-APPOINTMENT (OUTPATIENT)
Age: 32
End: 2022-08-04

## 2022-08-05 ENCOUNTER — APPOINTMENT (OUTPATIENT)
Dept: ANTEPARTUM | Facility: CLINIC | Age: 32
End: 2022-08-05

## 2022-08-05 ENCOUNTER — ASOB RESULT (OUTPATIENT)
Age: 32
End: 2022-08-05

## 2022-08-05 PROCEDURE — 76821 MIDDLE CEREBRAL ARTERY ECHO: CPT

## 2022-08-05 PROCEDURE — 76816 OB US FOLLOW-UP PER FETUS: CPT

## 2022-08-05 PROCEDURE — 76818 FETAL BIOPHYS PROFILE W/NST: CPT

## 2022-08-05 PROCEDURE — ZZZZZ: CPT

## 2022-08-08 ENCOUNTER — NON-APPOINTMENT (OUTPATIENT)
Age: 32
End: 2022-08-08

## 2022-08-08 ENCOUNTER — APPOINTMENT (OUTPATIENT)
Dept: OBGYN | Facility: CLINIC | Age: 32
End: 2022-08-08

## 2022-08-08 VITALS
BODY MASS INDEX: 32.3 KG/M2 | DIASTOLIC BLOOD PRESSURE: 76 MMHG | HEIGHT: 66 IN | WEIGHT: 201 LBS | SYSTOLIC BLOOD PRESSURE: 116 MMHG

## 2022-08-08 DIAGNOSIS — Z34.93 ENCOUNTER FOR SUPERVISION OF NORMAL PREGNANCY, UNSPECIFIED, THIRD TRIMESTER: ICD-10-CM

## 2022-08-08 LAB
BILIRUB UR QL STRIP: NORMAL
GLUCOSE UR-MCNC: NORMAL
HCG UR QL: 0.2 EU/DL
HGB UR QL STRIP.AUTO: NORMAL
KETONES UR-MCNC: NORMAL
LEUKOCYTE ESTERASE UR QL STRIP: ABNORMAL
NITRITE UR QL STRIP: NORMAL
PH UR STRIP: 6
PROT UR STRIP-MCNC: ABNORMAL
SP GR UR STRIP: 1.03

## 2022-08-08 PROCEDURE — 0502F SUBSEQUENT PRENATAL CARE: CPT

## 2022-08-08 RX ORDER — OXYTOCIN 10 UNIT/ML
333.33 VIAL (ML) INJECTION
Qty: 20 | Refills: 0 | Status: COMPLETED | OUTPATIENT
Start: 2022-08-09 | End: 2022-08-09

## 2022-08-08 RX ORDER — AMPICILLIN TRIHYDRATE 250 MG
2 CAPSULE ORAL ONCE
Refills: 0 | Status: COMPLETED | OUTPATIENT
Start: 2022-08-09 | End: 2022-08-09

## 2022-08-08 RX ORDER — CHLORHEXIDINE GLUCONATE 213 G/1000ML
1 SOLUTION TOPICAL ONCE
Refills: 0 | Status: DISCONTINUED | OUTPATIENT
Start: 2022-08-09 | End: 2022-08-10

## 2022-08-08 RX ORDER — CITRIC ACID/SODIUM CITRATE 300-500 MG
30 SOLUTION, ORAL ORAL ONCE
Refills: 0 | Status: DISCONTINUED | OUTPATIENT
Start: 2022-08-09 | End: 2022-08-10

## 2022-08-08 RX ORDER — AMPICILLIN TRIHYDRATE 250 MG
1 CAPSULE ORAL EVERY 4 HOURS
Refills: 0 | Status: DISCONTINUED | OUTPATIENT
Start: 2022-08-09 | End: 2022-08-10

## 2022-08-08 RX ORDER — SODIUM CHLORIDE 9 MG/ML
1000 INJECTION, SOLUTION INTRAVENOUS
Refills: 0 | Status: DISCONTINUED | OUTPATIENT
Start: 2022-08-09 | End: 2022-08-10

## 2022-08-08 NOTE — OB PROVIDER H&P - ASSESSMENT
Celeste Garcia is a 31 year old  at 39w by LMP who presents to L&D for eIOL.   -Admit to L&D  -Consent  -Admission labs  -IV fluids  -Labor: Intact/*ROM. Latent/Active labor. Shaquille *.   -Fetus: Cat I tracing. Continuous toco and fetal monitoring.   -GBS: Positive, amp ordered  -Analgesia:      Celeste Garcia is a 31 year old  at 39w by LMP who presents to L&D for eIOL.   -Admit to L&D  -Consent  -Admission labs  -IV fluids  -Labor: Intact. . Shaquille irregularly.   -Fetus: Cat I tracing. Continuous toco and fetal monitoring.   -GBS: Positive, amp ordered    Discussed with Dr. Arana

## 2022-08-08 NOTE — OB PROVIDER H&P - NSHPPHYSICALEXAM_GEN_ALL_CORE
T(C): 36.7 (08-09-22 @ 15:04), Max: 36.7 (08-09-22 @ 15:04)  HR: 101 (08-09-22 @ 13:15) (83 - 101)  BP: 127/75 (08-09-22 @ 13:15) (123/82 - 127/75)  RR: 16 (08-09-22 @ 15:04) (16 - 20)      Gen: NAD, well-appearing, AAOx3   Abd: Soft, gravid  Ext: non-tender, non-edematous  SVE: 3/50/-2  Bedside sono: vertex, anterior Bartholin gland cyst noted on the patient's left   FHT: baseline 150, moderate variability, +accels, -decels   [place]: irregular contractions

## 2022-08-08 NOTE — OB PROVIDER H&P - HISTORY OF PRESENT ILLNESS
Celeste Garcia is a 31 year old  at 39w by LMP who presents to L&D for eIOL.     CHELY: 22   LMP: 21     Pregnancy course:   Hx PEC in previous pregnancy   Asthma   Anxiety   Transaminitis ALT 73    VVC and BV vaginitis dx  s/p fluconazole, metronidazole     Obhx: G1 (2021) , PEC   Gynhx: Denies abnormal Paps, STIs   Pmhx: Anxiety, asthma   Pshx: Denies   Meds: PNVs   Allergies: NKDA   Social Hx: Denies alcohol, tobacco use during pregnancy     BMI: 32.44   Ultrasound: vtx, anterior   EFW:3145, 34%ile 8/5  Celeste Garcia is a 31 year old  at 39w by LMP who presents to L&D for eIOL. Patient denies vaginal bleeding leakage of fluid and contractions. She endorses goo fetal movement. Denies headache, RUQ pain and changes in vision. No complaints at this time.    CHELY: 22   LMP: 21     Pregnancy course:   Hx PEC in previous pregnancy   Asthma- in childhood   Anxiety   Transaminitis ALT 73    VVC and BV vaginitis dx  s/p fluconazole, metronidazole     Obhx: G1 (2021) , postpartum PEC   Gynhx: Denies abnormal Paps, STIs   Pmhx: Anxiety, asthma   Pshx: Denies   Meds: PNVs   Allergies: NKDA   Social Hx: Denies alcohol, tobacco use during pregnancy     BMI: 32.44   Ultrasound: vtx, anterior   EFW:3145, 34%ile 8/5

## 2022-08-09 ENCOUNTER — INPATIENT (INPATIENT)
Facility: HOSPITAL | Age: 32
LOS: 2 days | Discharge: HOME | End: 2022-08-12
Attending: OBSTETRICS & GYNECOLOGY | Admitting: OBSTETRICS & GYNECOLOGY
Payer: COMMERCIAL

## 2022-08-09 ENCOUNTER — APPOINTMENT (OUTPATIENT)
Dept: OBGYN | Facility: HOSPITAL | Age: 32
End: 2022-08-09

## 2022-08-09 VITALS
SYSTOLIC BLOOD PRESSURE: 123 MMHG | TEMPERATURE: 98 F | HEART RATE: 83 BPM | WEIGHT: 201.06 LBS | HEIGHT: 66 IN | RESPIRATION RATE: 20 BRPM | DIASTOLIC BLOOD PRESSURE: 82 MMHG

## 2022-08-09 DIAGNOSIS — O26.899 OTHER SPECIFIED PREGNANCY RELATED CONDITIONS, UNSPECIFIED TRIMESTER: ICD-10-CM

## 2022-08-09 LAB
ALBUMIN SERPL ELPH-MCNC: 3.3 G/DL — SIGNIFICANT CHANGE UP (ref 3.3–5.2)
ALP SERPL-CCNC: 143 U/L — HIGH (ref 40–120)
ALT FLD-CCNC: 15 U/L — SIGNIFICANT CHANGE UP
ANION GAP SERPL CALC-SCNC: 11 MMOL/L — SIGNIFICANT CHANGE UP (ref 5–17)
APPEARANCE UR: CLEAR — SIGNIFICANT CHANGE UP
APTT BLD: 26.9 SEC — LOW (ref 27.5–35.5)
AST SERPL-CCNC: 17 U/L — SIGNIFICANT CHANGE UP
BACTERIA # UR AUTO: ABNORMAL
BASOPHILS # BLD AUTO: 0.05 K/UL — SIGNIFICANT CHANGE UP (ref 0–0.2)
BASOPHILS NFR BLD AUTO: 0.5 % — SIGNIFICANT CHANGE UP (ref 0–2)
BILIRUB SERPL-MCNC: 0.3 MG/DL — LOW (ref 0.4–2)
BILIRUB UR-MCNC: NEGATIVE — SIGNIFICANT CHANGE UP
BLD GP AB SCN SERPL QL: SIGNIFICANT CHANGE UP
BUN SERPL-MCNC: 7.7 MG/DL — LOW (ref 8–20)
CALCIUM SERPL-MCNC: 8.5 MG/DL — SIGNIFICANT CHANGE UP (ref 8.4–10.5)
CHLORIDE SERPL-SCNC: 102 MMOL/L — SIGNIFICANT CHANGE UP (ref 98–107)
CO2 SERPL-SCNC: 19 MMOL/L — LOW (ref 22–29)
COD CRY URNS QL: ABNORMAL
COLOR SPEC: YELLOW — SIGNIFICANT CHANGE UP
COVID-19 SPIKE DOMAIN AB INTERP: POSITIVE
COVID-19 SPIKE DOMAIN ANTIBODY RESULT: 16.6 U/ML — HIGH
CREAT ?TM UR-MCNC: 110 MG/DL — SIGNIFICANT CHANGE UP
CREAT SERPL-MCNC: 0.46 MG/DL — LOW (ref 0.5–1.3)
DIFF PNL FLD: ABNORMAL
EGFR: 132 ML/MIN/1.73M2 — SIGNIFICANT CHANGE UP
EOSINOPHIL # BLD AUTO: 0.22 K/UL — SIGNIFICANT CHANGE UP (ref 0–0.5)
EOSINOPHIL NFR BLD AUTO: 2.3 % — SIGNIFICANT CHANGE UP (ref 0–6)
EPI CELLS # UR: SIGNIFICANT CHANGE UP
FIBRINOGEN PPP-MCNC: 606 MG/DL — HIGH (ref 330–520)
GLUCOSE SERPL-MCNC: 98 MG/DL — SIGNIFICANT CHANGE UP (ref 70–99)
GLUCOSE UR QL: 50 MG/DL
HCT VFR BLD CALC: 34.3 % — LOW (ref 34.5–45)
HGB BLD-MCNC: 11.6 G/DL — SIGNIFICANT CHANGE UP (ref 11.5–15.5)
IMM GRANULOCYTES NFR BLD AUTO: 0.3 % — SIGNIFICANT CHANGE UP (ref 0–1.5)
INR BLD: 0.91 RATIO — SIGNIFICANT CHANGE UP (ref 0.88–1.16)
KETONES UR-MCNC: ABNORMAL
LDH SERPL L TO P-CCNC: 212 U/L — HIGH (ref 98–192)
LEUKOCYTE ESTERASE UR-ACNC: ABNORMAL
LYMPHOCYTES # BLD AUTO: 1.68 K/UL — SIGNIFICANT CHANGE UP (ref 1–3.3)
LYMPHOCYTES # BLD AUTO: 17.5 % — SIGNIFICANT CHANGE UP (ref 13–44)
MCHC RBC-ENTMCNC: 30.9 PG — SIGNIFICANT CHANGE UP (ref 27–34)
MCHC RBC-ENTMCNC: 33.8 GM/DL — SIGNIFICANT CHANGE UP (ref 32–36)
MCV RBC AUTO: 91.2 FL — SIGNIFICANT CHANGE UP (ref 80–100)
MONOCYTES # BLD AUTO: 0.76 K/UL — SIGNIFICANT CHANGE UP (ref 0–0.9)
MONOCYTES NFR BLD AUTO: 7.9 % — SIGNIFICANT CHANGE UP (ref 2–14)
NEUTROPHILS # BLD AUTO: 6.86 K/UL — SIGNIFICANT CHANGE UP (ref 1.8–7.4)
NEUTROPHILS NFR BLD AUTO: 71.5 % — SIGNIFICANT CHANGE UP (ref 43–77)
NITRITE UR-MCNC: NEGATIVE — SIGNIFICANT CHANGE UP
PH UR: 6.5 — SIGNIFICANT CHANGE UP (ref 5–8)
PLATELET # BLD AUTO: 287 K/UL — SIGNIFICANT CHANGE UP (ref 150–400)
POTASSIUM SERPL-MCNC: 3.9 MMOL/L — SIGNIFICANT CHANGE UP (ref 3.5–5.3)
POTASSIUM SERPL-SCNC: 3.9 MMOL/L — SIGNIFICANT CHANGE UP (ref 3.5–5.3)
PROT ?TM UR-MCNC: 20 MG/DL — HIGH (ref 0–12)
PROT SERPL-MCNC: 6.2 G/DL — LOW (ref 6.6–8.7)
PROT UR-MCNC: NEGATIVE — SIGNIFICANT CHANGE UP
PROT/CREAT UR-RTO: 0.2 RATIO — SIGNIFICANT CHANGE UP
PROTHROM AB SERPL-ACNC: 10.5 SEC — SIGNIFICANT CHANGE UP (ref 10.5–13.4)
RBC # BLD: 3.76 M/UL — LOW (ref 3.8–5.2)
RBC # FLD: 12.3 % — SIGNIFICANT CHANGE UP (ref 10.3–14.5)
RBC CASTS # UR COMP ASSIST: SIGNIFICANT CHANGE UP /HPF (ref 0–4)
SARS-COV-2 IGG+IGM SERPL QL IA: 16.6 U/ML — HIGH
SARS-COV-2 IGG+IGM SERPL QL IA: POSITIVE
SARS-COV-2 RNA SPEC QL NAA+PROBE: SIGNIFICANT CHANGE UP
SODIUM SERPL-SCNC: 132 MMOL/L — LOW (ref 135–145)
SP GR SPEC: 1.02 — SIGNIFICANT CHANGE UP (ref 1.01–1.02)
URATE SERPL-MCNC: 3.9 MG/DL — SIGNIFICANT CHANGE UP (ref 2.4–5.7)
UROBILINOGEN FLD QL: NEGATIVE MG/DL — SIGNIFICANT CHANGE UP
WBC # BLD: 9.6 K/UL — SIGNIFICANT CHANGE UP (ref 3.8–10.5)
WBC # FLD AUTO: 9.6 K/UL — SIGNIFICANT CHANGE UP (ref 3.8–10.5)
WBC UR QL: ABNORMAL /HPF (ref 0–5)

## 2022-08-09 RX ORDER — ONDANSETRON 8 MG/1
4 TABLET, FILM COATED ORAL ONCE
Refills: 0 | Status: COMPLETED | OUTPATIENT
Start: 2022-08-09 | End: 2022-08-10

## 2022-08-09 RX ORDER — OXYTOCIN 10 UNIT/ML
2 VIAL (ML) INJECTION
Qty: 30 | Refills: 0 | Status: DISCONTINUED | OUTPATIENT
Start: 2022-08-09 | End: 2022-08-12

## 2022-08-09 RX ADMIN — Medication 108 GRAM(S): at 23:53

## 2022-08-09 RX ADMIN — Medication 108 GRAM(S): at 19:27

## 2022-08-09 RX ADMIN — Medication 200 GRAM(S): at 15:22

## 2022-08-09 RX ADMIN — Medication 2 MILLIUNIT(S)/MIN: at 17:15

## 2022-08-09 RX ADMIN — SODIUM CHLORIDE 125 MILLILITER(S): 9 INJECTION, SOLUTION INTRAVENOUS at 21:27

## 2022-08-09 NOTE — OB RN PATIENT PROFILE - LIMIT VISITORS, INFANT PROFILE
[facility], [place]   Psychiatric Progress Note  Hospital Day: 22        Interim History:   The patient's care was discussed with the treatment team during the daily team meeting and/or staff's chart notes were reviewed.  Staff report that Michelle has continued to take oral medications. Still lacks insight and perseverates on meeting with provider all day at nurses window.     Patient tells me that she is ready to go. She still doesn't believe she needs to be here. When she finds out I won't discharge today, she chases me out of the room.     Psychiatric Symptoms: Ongoing psychosis, agitation.     Medication side effects reported: None seen thus far   Other issues reported by patient: None          Medications:       atorvastatin  40 mg Oral QPM     calcium carbonate 500 mg (elemental)  500 mg Oral BID w/meals     chlorproMAZINE  100 mg Oral BID     cholecalciferol  50,000 Units Oral Q7 Days     haloperidol lactate  10 mg Intramuscular At Bedtime    Or     haloperidol  15 mg Oral At Bedtime     losartan  100 mg Oral Daily     metoprolol succinate ER  50 mg Oral Daily     multivitamin, therapeutic  1 tablet Oral Daily     trihexyphenidyl  5 mg Oral BID          Allergies:     Allergies   Allergen Reactions     Lisinopril Cough          Labs:   No results found for this or any previous visit (from the past 48 hour(s)).       Psychiatric Examination:     BP (!) 146/105   Pulse 132   Temp 100  F (37.8  C) (Tympanic)   Resp 18   LMP  (LMP Unknown)   SpO2 100%   Weight is 0 lbs 0 oz  There is no height or weight on file to calculate BMI.    Orthostatic Vitals     None        Appearance: awake, alert, dressed in hospital scrubs and unkempt, poor hygiene   Attitude:  cooperative, guarded   Eye Contact:  Intense at times   Mood: angry  Affect:  labile  Speech: rambling  Language: fluent and intact in English  Psychomotor, Gait, Musculoskeletal:  no evidence of tardive dyskinesia, dystonia, or  tics and intact station, gait and muscle tone  Throught Process:  improved organization  Associations:  no loose associations  Thought Content:  patient appears to be responding to internal stimuli and paranoid  Insight:  absent  Judgement:  poor   Oriented to:  Not formally tested, sensorium appears intact   Attention Span and Concentration:  limited  Recent and Remote Memory:  limited  Fund of Knowledge:  Not formally tested    Clinical Global Impressions  First:  Considering your total clinical experience with this particular patient population, how severe are the patient's symptoms at this time?: 7 (09/25/19 1414)  Compared to the patient's condition at the START of treatment, this patient's condition is:: 4 (09/25/19 1414)  Most recent:  Considering your total clinical experience with this particular patient population, how severe are the patient's symptoms at this time?: 7 (10/14/19 1240)  Compared to the patient's condition at the START of treatment, this patient's condition is:: 4 (10/14/19 1240)           Precautions:     Behavioral Orders   Procedures     Assault precautions     Code 1 - Restrict to Unit     Elopement precautions     Routine Programming     As clinically indicated     Sexual precautions     Status 15     Every 15 minutes.          Diagnoses:   Schizophrenia         Assessment & Plan:   Assessment and hospital summary:  Michelle Pino is a 58 year old female with prior psychiatric diagnosis of schizophrenia and schizoaffective disorder who presents with psychosis and violence in the context of medication non-adherence. Her last psychiatric hospitalization was from April-June of this year. She was most recently followed by Dr. Yoshi Zabala at [facility]. Current psychosocial stressors include chronic mental health issues, family dynamics and medical issues. Patient's support system includes family, county and outpatient team. Substance use does not appear to be playing a contributing role in the  patient's presentation.  There is no known genetic loading. Medical history does appear to be significant for Vitamin D deficiency and prior CVA. The MSE on admission was notable for lack of SI or HI, psychosis, irritability, aggression and lack of insight. She denied self injurious behaviors. Her current presentation is consistent with a schizophrenia spectrum disorder.    Target psychiatric symptoms and interventions:  Psychosis  Of note, regimen she was stable on at discharge from her last hospitalization was Haldol 15 mg at bedtime, Thorazine 150 mg at bedtime and Haldol Decanoate 100 mg Q30 days     -continue oral Thorazine to 100 mg BID  -haldol 15 mg PO QHS to minimize IM injections IM back up per [facility] order  -continue with haloperidol, lorazepam, diphenhydramine as needed    Medical Problems and Treatments:  Continue to encourage PO intake    Behavioral/Psychological/Social:  Encourage unit programming    Patient has required temporary restraints for [facility] medications.     Disposition Plan   Reason for ongoing admission: is unable to care for self due to severe psychosis or tad   Discharge location: TBD. Likely [facility]  Discharge Medications: not ordered  Follow-up Appointments: not scheduled  Legal Status: full commitment with [facility] for olanzapine, chlorpromazine, risperidone, haloperidol  Entered by: Jarett Pack on 10/15/2019at 10:48 AM           no

## 2022-08-09 NOTE — OB RN PATIENT PROFILE - FALL HARM RISK - UNIVERSAL INTERVENTIONS
Bed in lowest position, wheels locked, appropriate side rails in place/Call bell, personal items and telephone in reach/Instruct patient to call for assistance before getting out of bed or chair/Non-slip footwear when patient is out of bed/[place] to call system/Physically safe environment - no spills, clutter or unnecessary equipment/Purposeful Proactive Rounding/Room/bathroom lighting operational, light cord in reach

## 2022-08-09 NOTE — OB RN PATIENT PROFILE - NS_CONSENTSAPP_OBGYN_ALL_OB
[de-identified] : This is a very nice 76 year old  female experiencing worsening pain in the right knee which is severe in intensity and has been going on for at least 1 year now. She has tried cortisone which has worked well in the past as does Euflexxa.  She does get good relief form gel, She is a smoker.  The pain substantially limits activities of daily living. Walking tolerance is reduced. Medication and activity modification have been minimally effective for a period lasting greater than three months in duration. Assistive devices and external support were not deemed by the patient to be helpful in improving their function. Due to the severity of osteoarthritis and level of pain, physical therapy is contraindicated. Pain and restriction of function are intolerable at this time. The patient denies any radiation of the pain to the feet and it is not associated with numbness, tingling, or weakness. \par Also here because she fell on her left knee replacement approximately 1 month ago and has had persistent swelling of the ankle since.  She is seeing her medical doctor for this.  She was already ruled out for DVT she tells me.  No fevers or chills.
N/A

## 2022-08-10 ENCOUNTER — TRANSCRIPTION ENCOUNTER (OUTPATIENT)
Age: 32
End: 2022-08-10

## 2022-08-10 LAB — T PALLIDUM AB TITR SER: NEGATIVE — SIGNIFICANT CHANGE UP

## 2022-08-10 PROCEDURE — 59400 OBSTETRICAL CARE: CPT | Mod: U9

## 2022-08-10 RX ORDER — AER TRAVELER 0.5 G/1
1 SOLUTION RECTAL; TOPICAL EVERY 4 HOURS
Refills: 0 | Status: DISCONTINUED | OUTPATIENT
Start: 2022-08-10 | End: 2022-08-12

## 2022-08-10 RX ORDER — IBUPROFEN 200 MG
600 TABLET ORAL EVERY 6 HOURS
Refills: 0 | Status: COMPLETED | OUTPATIENT
Start: 2022-08-10 | End: 2023-07-09

## 2022-08-10 RX ORDER — TETANUS TOXOID, REDUCED DIPHTHERIA TOXOID AND ACELLULAR PERTUSSIS VACCINE, ADSORBED 5; 2.5; 8; 8; 2.5 [IU]/.5ML; [IU]/.5ML; UG/.5ML; UG/.5ML; UG/.5ML
0.5 SUSPENSION INTRAMUSCULAR ONCE
Refills: 0 | Status: DISCONTINUED | OUTPATIENT
Start: 2022-08-10 | End: 2022-08-12

## 2022-08-10 RX ORDER — HYDROCORTISONE 1 %
1 OINTMENT (GRAM) TOPICAL EVERY 6 HOURS
Refills: 0 | Status: DISCONTINUED | OUTPATIENT
Start: 2022-08-10 | End: 2022-08-12

## 2022-08-10 RX ORDER — DIPHENHYDRAMINE HCL 50 MG
25 CAPSULE ORAL EVERY 6 HOURS
Refills: 0 | Status: DISCONTINUED | OUTPATIENT
Start: 2022-08-10 | End: 2022-08-12

## 2022-08-10 RX ORDER — PRAMOXINE HYDROCHLORIDE 150 MG/15G
1 AEROSOL, FOAM RECTAL EVERY 4 HOURS
Refills: 0 | Status: DISCONTINUED | OUTPATIENT
Start: 2022-08-10 | End: 2022-08-12

## 2022-08-10 RX ORDER — BENZOCAINE 10 %
1 GEL (GRAM) MUCOUS MEMBRANE EVERY 6 HOURS
Refills: 0 | Status: DISCONTINUED | OUTPATIENT
Start: 2022-08-10 | End: 2022-08-12

## 2022-08-10 RX ORDER — MAGNESIUM HYDROXIDE 400 MG/1
30 TABLET, CHEWABLE ORAL
Refills: 0 | Status: DISCONTINUED | OUTPATIENT
Start: 2022-08-10 | End: 2022-08-12

## 2022-08-10 RX ORDER — KETOROLAC TROMETHAMINE 30 MG/ML
30 SYRINGE (ML) INJECTION ONCE
Refills: 0 | Status: DISCONTINUED | OUTPATIENT
Start: 2022-08-10 | End: 2022-08-10

## 2022-08-10 RX ORDER — LANOLIN
1 OINTMENT (GRAM) TOPICAL EVERY 6 HOURS
Refills: 0 | Status: DISCONTINUED | OUTPATIENT
Start: 2022-08-10 | End: 2022-08-12

## 2022-08-10 RX ORDER — OXYCODONE HYDROCHLORIDE 5 MG/1
5 TABLET ORAL ONCE
Refills: 0 | Status: DISCONTINUED | OUTPATIENT
Start: 2022-08-10 | End: 2022-08-12

## 2022-08-10 RX ORDER — IBUPROFEN 200 MG
600 TABLET ORAL EVERY 6 HOURS
Refills: 0 | Status: DISCONTINUED | OUTPATIENT
Start: 2022-08-10 | End: 2022-08-12

## 2022-08-10 RX ORDER — DIBUCAINE 1 %
1 OINTMENT (GRAM) RECTAL EVERY 6 HOURS
Refills: 0 | Status: DISCONTINUED | OUTPATIENT
Start: 2022-08-10 | End: 2022-08-12

## 2022-08-10 RX ORDER — OXYCODONE HYDROCHLORIDE 5 MG/1
5 TABLET ORAL
Refills: 0 | Status: DISCONTINUED | OUTPATIENT
Start: 2022-08-10 | End: 2022-08-12

## 2022-08-10 RX ORDER — ACETAMINOPHEN 500 MG
975 TABLET ORAL
Refills: 0 | Status: DISCONTINUED | OUTPATIENT
Start: 2022-08-10 | End: 2022-08-12

## 2022-08-10 RX ORDER — SODIUM CHLORIDE 9 MG/ML
3 INJECTION INTRAMUSCULAR; INTRAVENOUS; SUBCUTANEOUS EVERY 8 HOURS
Refills: 0 | Status: DISCONTINUED | OUTPATIENT
Start: 2022-08-10 | End: 2022-08-12

## 2022-08-10 RX ORDER — SIMETHICONE 80 MG/1
80 TABLET, CHEWABLE ORAL EVERY 4 HOURS
Refills: 0 | Status: DISCONTINUED | OUTPATIENT
Start: 2022-08-10 | End: 2022-08-12

## 2022-08-10 RX ORDER — OXYTOCIN 10 UNIT/ML
333.33 VIAL (ML) INJECTION
Qty: 20 | Refills: 0 | Status: DISCONTINUED | OUTPATIENT
Start: 2022-08-10 | End: 2022-08-12

## 2022-08-10 RX ADMIN — Medication 600 MILLIGRAM(S): at 18:40

## 2022-08-10 RX ADMIN — Medication 30 MILLIGRAM(S): at 08:36

## 2022-08-10 RX ADMIN — Medication 975 MILLIGRAM(S): at 20:25

## 2022-08-10 RX ADMIN — Medication 108 GRAM(S): at 03:51

## 2022-08-10 RX ADMIN — Medication 975 MILLIGRAM(S): at 16:06

## 2022-08-10 RX ADMIN — SODIUM CHLORIDE 3 MILLILITER(S): 9 INJECTION INTRAMUSCULAR; INTRAVENOUS; SUBCUTANEOUS at 13:45

## 2022-08-10 RX ADMIN — Medication 600 MILLIGRAM(S): at 23:30

## 2022-08-10 RX ADMIN — Medication 1 TABLET(S): at 11:28

## 2022-08-10 RX ADMIN — Medication 600 MILLIGRAM(S): at 18:20

## 2022-08-10 RX ADMIN — Medication 600 MILLIGRAM(S): at 12:15

## 2022-08-10 RX ADMIN — Medication 1000 MILLIUNIT(S)/MIN: at 07:58

## 2022-08-10 RX ADMIN — ONDANSETRON 4 MILLIGRAM(S): 8 TABLET, FILM COATED ORAL at 00:03

## 2022-08-10 RX ADMIN — Medication 975 MILLIGRAM(S): at 17:00

## 2022-08-10 RX ADMIN — Medication 30 MILLIGRAM(S): at 09:06

## 2022-08-10 RX ADMIN — Medication 600 MILLIGRAM(S): at 11:28

## 2022-08-10 NOTE — OB PROVIDER LABOR PROGRESS NOTE - ASSESSMENT
-VSS  -Continue augmentation with pitocin currently at 6  -will reassess as needed
Maternal and fetal status reassuring  Continue to monitor
Plan:   - VSS  - Will reposition patient with peanut ball  - C/w pitocin augmentation  - Continuous FHT/[place]  - Maternal/fetal status reassuring    
-VSS  -continue augmentation with Pitocin Currently at 10  -Will re-assess as needed  
Plan:  - VSS  - Reactive tracing  - Instructed regarding pushing technique  - Will prepare to push  - C/w pitocin augmentation  - Maternal/fetal status reassuring    Plan d/w Dr. Hansen and Dr. Villar

## 2022-08-10 NOTE — OB RN DELIVERY SUMMARY - NS_SEPSISRSKCALC_OBGYN_ALL_OB_FT
EOS calculated successfully. EOS Risk Factor: 0.5/1000 live births ([facility] national incidence); GA=39w1d; Temp=98.6; ROM=5.35; GBS='Positive'; Antibiotics='GBS specific antibiotics > 2 hrs prior to birth'

## 2022-08-10 NOTE — DISCHARGE NOTE OB - PATIENT PORTAL LINK FT
You can access the FollowMyHealth Patient Portal offered by [facility] by registering at the following website: [email or web address]. By joining [facility]’s FollowMyHealth portal, you will also be able to view your health information using other applications (apps) compatible with our system.

## 2022-08-10 NOTE — OB PROVIDER LABOR PROGRESS NOTE - NS_OBIHIFHRDETAILS_OBGYN_ALL_OB_FT
140bpm, mod variability, +accels, +intermittent early decels
145 bpm, moderate variability + accels, intermittent variable decels, intermittent early decels
baseline 140, moderate variability, +accelerations, -decelerations
baseline 130s, moderate variability, + accelerations, - decelerations
145 bpm, moderate variability - accels, recurrent early decels present

## 2022-08-10 NOTE — OB PROVIDER DELIVERY SUMMARY - NSSELHIDDEN_OBGYN_ALL_OB_FT
[NS_DeliveryRN_OBGYN_ALL_OB_FT:[id number]],[NS_DeliveryAttending1_OBGYN_ALL_OB_FT:[id number]],[NS_DeliveryAssist2_OBGYN_ALL_OB_FT:[id number]=]

## 2022-08-10 NOTE — DISCHARGE NOTE OB - MEDICATION SUMMARY - MEDICATIONS TO TAKE
I will START or STAY ON the medications listed below when I get home from the hospital:    acetaminophen 650 mg oral tablet, extended release  -- 1 tab(s) by mouth every 8 hours MDD:4  -- This product contains acetaminophen.  Do not use  with any other product containing acetaminophen to prevent possible liver damage.    -- Indication: For PAIN    ibuprofen 600 mg oral tablet  -- 1 tab(s) by mouth every 6 hours MDD:4  -- Do not take this drug if you are pregnant.  It is very important that you take or use this exactly as directed.  Do not skip doses or discontinue unless directed by your doctor.  May cause drowsiness or dizziness.  Obtain medical advice before taking any non-prescription drugs as some may affect the action of this medication.  Take with food or milk.    -- Indication: For PAIN    MiraLax oral powder for reconstitution  -- 17 gram(s) by mouth once a day   -- Dilute this medication with liquid before administration.  It is very important that you take or use this exactly as directed.  Do not skip doses or discontinue unless directed by your doctor.    -- Indication: For CONSTIPATION

## 2022-08-10 NOTE — DISCHARGE NOTE OB - HOSPITAL COURSE
31 y.o  s/p  @ 39w2d, eIOL. Uncomplicated delivery.  She had a normal postpartum course and was discharged home in stable condition on postpartum day 1.                          11.6   9.60  )-----------( 287      ( 09 Aug 2022 14:00 )             34.3

## 2022-08-10 NOTE — DISCHARGE NOTE OB - NS OB DC TDAP REASON NOT RECEIVED
Chief Complaint   Patient presents with   Ramirez Rivas Establish Care     1. Have you been to the ER, urgent care clinic since your last visit? Hospitalized since your last visit? No    2. Have you seen or consulted any other health care providers outside of the [address] [place] [address] since your last visit? Include any pap smears or colon screening.  No      Right shoulder pain Contraindicated

## 2022-08-10 NOTE — OB RN DELIVERY SUMMARY - NSSELHIDDEN_OBGYN_ALL_OB_FT
[NS_DeliveryRN_OBGYN_ALL_OB_FT:[id number]] [NS_DeliveryRN_OBGYN_ALL_OB_FT:[id number]],[NS_DeliveryAttending1_OBGYN_ALL_OB_FT:[id number]] [NS_DeliveryRN_OBGYN_ALL_OB_FT:[id number]],[NS_DeliveryAttending1_OBGYN_ALL_OB_FT:[id number]],[NS_DeliveryAssist2_OBGYN_ALL_OB_FT:[id number]]

## 2022-08-10 NOTE — OB PROVIDER DELIVERY SUMMARY - NSPROVIDERDELIVERYNOTE_OBGYN_ALL_OB_FT
31 y.o  at 39wks 1d presents for eIOL.    Procedure: Normal spontaneous vaginal delivery   Findings: Viable male infant delivered in cephalic presentation at 0757 AM, placenta delivered at 0802 AM   Apgar scores 9/9.   Weight will be recorded after 1 hour to allow for skin-to-skin contact.  Laceration(s): 1st degree perineal and R periurethral laceration   Repair: yes  EBL: 121  Complications: none     Procedure:   Patient felt rectal pressure and was found to be fully dilated, +1 station. She pushed effectively. She delivered a viable male infant. A loose nuchal cord X 1 delivered through and delayed cord clamping was performed for 60 seconds. Placenta delivered intact and pitocin was started at the delivery of infant. Perineum and vagina were inspected,  1st degree laceration and R periurethral present and repaired. Patient has a left anterior 2cm bartholin gland cyst  that is firm and a 5mm cyst on the left gluteus. Adequate hemostasis was obtained. Fundus was noted to be firm. 31 y.o  at 39wks 1d presents for eIOL.    Procedure: Normal spontaneous vaginal delivery   Findings: Viable male infant delivered in cephalic presentation at 0757 AM, placenta delivered at 0802 AM   Apgar scores 9/9.   Weight will be recorded after 1 hour to allow for skin-to-skin contact.  Laceration(s): 1st degree perineal and R. labial  laceration   Repair: yes  EBL: 121  Complications: none     Procedure:   Patient felt rectal pressure and was found to be fully dilated, +1 station. She pushed effectively. She delivered a viable male infant. A loose nuchal cord X 1 delivered through and delayed cord clamping was performed for 60 seconds. Placenta delivered intact and pitocin was started at the delivery of infant. Perineum and vagina were inspected,  1st degree laceration and R periurethral present and repaired. Patient has a left anterior 2cm bartholin gland cyst  that is firm and a 5mm cyst on the left gluteus. Adequate hemostasis was obtained. Fundus was noted to be firm.

## 2022-08-10 NOTE — OB PROVIDER LABOR PROGRESS NOTE - NS_SUBJECTIVE/OBJECTIVE_OBGYN_ALL_OB_FT
Feeling increased pressure.
Pt re-examined at bedside. Resting comfortably. Reports nausea.
Patient examined at bedside.  Reporting intermittent pelvic pressure w/ contractions
Patient reporting pressure w/ contractions
Pt re-examined. Unchanged cervical exam. AROM with clear fluid. nausea resolved.

## 2022-08-10 NOTE — DISCHARGE NOTE OB - NS MD DC FALL RISK RISK
For information on Fall & Injury Prevention, visit: [email or web address] OR  [email or web address] OR  https://www.cdc.gov/steadi/patient.html

## 2022-08-10 NOTE — DISCHARGE NOTE OB - CARE PROVIDER_API CALL
Ysabel Villar (DO; MPH)  Obstetrics and Gynecology  [address] [place] [address]  Phone: [phone]  Fax: [phone]  Follow Up Time:

## 2022-08-11 LAB
HCT VFR BLD CALC: 31.8 % — LOW (ref 34.5–45)
HGB BLD-MCNC: 10.4 G/DL — LOW (ref 11.5–15.5)

## 2022-08-11 RX ORDER — IBUPROFEN 200 MG
1 TABLET ORAL
Qty: 30 | Refills: 0
Start: 2022-08-11

## 2022-08-11 RX ORDER — ACETAMINOPHEN 500 MG
1 TABLET ORAL
Qty: 30 | Refills: 0
Start: 2022-08-11 | End: 2022-08-20

## 2022-08-11 RX ORDER — POLYETHYLENE GLYCOL 3350 17 G/17G
17 POWDER, FOR SOLUTION ORAL
Qty: 119 | Refills: 0
Start: 2022-08-11 | End: 2022-08-17

## 2022-08-11 RX ADMIN — Medication 600 MILLIGRAM(S): at 12:20

## 2022-08-11 RX ADMIN — Medication 975 MILLIGRAM(S): at 02:21

## 2022-08-11 RX ADMIN — Medication 975 MILLIGRAM(S): at 21:48

## 2022-08-11 RX ADMIN — Medication 975 MILLIGRAM(S): at 21:44

## 2022-08-11 RX ADMIN — Medication 975 MILLIGRAM(S): at 15:42

## 2022-08-11 RX ADMIN — Medication 1 TABLET(S): at 12:19

## 2022-08-11 RX ADMIN — Medication 600 MILLIGRAM(S): at 17:39

## 2022-08-11 RX ADMIN — Medication 600 MILLIGRAM(S): at 05:50

## 2022-08-11 NOTE — PROGRESS NOTE ADULT - ATTENDING COMMENTS
31y  now stable PPD#1 s/p spontaneous vaginal delivery with a history of postpartum readmit due to severe pre-eclampsia, currently asymptomatic with normal BP. Will continue close observation and discharge in am. 31y  now stable PPD#1 s/p spontaneous vaginal delivery with a history of postpartum readmit due to severe pre-eclampsia, currently asymptomatic with normal BP. Will continue close observation and discharge in am. Consent for circumcision signed after questions answered.

## 2022-08-11 NOTE — PROGRESS NOTE ADULT - SUBJECTIVE AND OBJECTIVE BOX
BARBARA WHITE is a 31y  now PPD#1 s/p spontaneous vaginal delivery at 39w2d gestation, uncomplicated.    S:    No acute events overnight.   The patient has no complaints.  Pain controlled with current treatment regimen.   She is ambulating without difficulty and tolerating PO.   + flatus/-BM/+ voiding   She endorses appropriate lochia, which is decreasing.   She is breastfeeding without difficulty.   She denies fevers, chills, nausea and vomiting.   She denies lightheadedness, dizziness, palpitations, chest pain, SOB, RUQ pain, blurry vision, new-onset swelling, and blurry vision.    O:    T(C): 36.3 (22 @ 04:14), Max: 36.9 (08-10-22 @ 07:19)  HR: 86 (22 @ 04:14) (62 - 110)  BP: 106/65 (22 @ 04:14) (106/65 - 131/78)  RR: 18 (22 @ 04:14) (16 - 18)  SpO2: 99% (22 @ 04:14) (95% - 100%)    Gen: NAD, AOx3  CV: RRR, S1/S2 present  Pulm: CTAB  Breast: Nontender, non-engorged   Abdomen:  Soft, non-tender, non-distended, +bowel sounds  Uterus:  Fundus firm below umbilicus  VE:  Expectant lochia  Ext:  Non-tender and non-edematous                          11.6   9.60  )-----------( 287      ( 09 Aug 2022 14:00 )             34.3         132<L>  |  102  |  7.7<L>  ----------------------------<  98  3.9   |  19.0<L>  |  0.46<L>    Ca    8.5      09 Aug 2022 16:10    TPro  6.2<L>  /  Alb  3.3  /  TBili  0.3<L>  /  DBili  x   /  AST  17  /  ALT  15  /  AlkPhos  143<H>         BARBARA WHITE is a 31y  now PPD#1 s/p spontaneous vaginal delivery at 39w2d gestation, uncomplicated.    S:    No acute events overnight.   The patient has no complaints.  Pain controlled with current treatment regimen.   She is ambulating without difficulty and tolerating PO.   + flatus/-BM/+ voiding   She endorses appropriate lochia, which is decreasing.   She is breastfeeding without difficulty.   She denies fevers, chills, nausea and vomiting.   She denies lightheadedness, dizziness, palpitations, chest pain, SOB, RUQ pain, blurry vision, new-onset swelling, and blurry vision.    O:    T(C): 36.3 (22 @ 04:14), Max: 36.9 (08-10-22 @ 07:19)  HR: 86 (22 @ 04:14) (62 - 110)  BP: 106/65 (22 @ 04:14) (106/65 - 131/78)  RR: 18 (22 @ 04:14) (16 - 18)  SpO2: 99% (22 @ 04:14) (95% - 100%)    Gen: NAD, AOx3  CV: RRR, S1/S2 present  Pulm: CTAB  Breast: Nontender, non-engorged   Abdomen:  Soft, non-tender, non-distended, +bowel sounds  Uterus:  Fundus firm below umbilicus  VE:  Expected lochia  Ext:  Non-tender and non-edematous                          11.6   9.60  )-----------( 287      ( 09 Aug 2022 14:00 )             34.3         132<L>  |  102  |  7.7<L>  ----------------------------<  98  3.9   |  19.0<L>  |  0.46<L>    Ca    8.5      09 Aug 2022 16:10    TPro  6.2<L>  /  Alb  3.3  /  TBili  0.3<L>  /  DBili  x   /  AST  17  /  ALT  15  /  AlkPhos  143<H>                              10.4   x     )-----------( x        ( 11 Aug 2022 07:00 )             31.8

## 2022-08-11 NOTE — PROGRESS NOTE ADULT - ASSESSMENT
BARBARA WHITE is a 31y  now PPD#1 s/p spontaneous vaginal delivery at 39w2d gestation, uncomplicated.    A/P:    -Vital signs stable  -Hgb: 11.6 -> AM labs pending   -Voiding, tolerating PO, bowel function nml   -Advance care as tolerated   -Continue routine postpartum care and education  -Healthy male infant, desires circumcision  -Dispo: Patient to be discharged when meeting all postpartum and postoperative milestones and pending attending approval. BARBARA WHITE is a 31y  now PPD#1 s/p spontaneous vaginal delivery at 39w2d gestation, uncomplicated.    A/P:    -Vital signs stable  -Hgb: 11.6 -> 10.4  -Voiding, tolerating PO, bowel function nml   -Advance care as tolerated   -Continue routine postpartum care and education  -Healthy male infant, desires circumcision  -Dispo: Patient to be discharged when meeting all postpartum and postoperative milestones and pending attending approval.    PGY4 Addendum: Subjective Hx, Physical Exam, & Laboratory results reviewed. I agree with the assessment and plan of care, as discussed above, and have edited as necessary.  CADY Fernández MD

## 2022-08-12 VITALS
SYSTOLIC BLOOD PRESSURE: 116 MMHG | OXYGEN SATURATION: 98 % | DIASTOLIC BLOOD PRESSURE: 79 MMHG | RESPIRATION RATE: 17 BRPM | HEART RATE: 81 BPM

## 2022-08-12 PROCEDURE — U0003: CPT

## 2022-08-12 PROCEDURE — 86769 SARS-COV-2 COVID-19 ANTIBODY: CPT

## 2022-08-12 PROCEDURE — 86900 BLOOD TYPING SEROLOGIC ABO: CPT

## 2022-08-12 PROCEDURE — 86850 RBC ANTIBODY SCREEN: CPT

## 2022-08-12 PROCEDURE — 85025 COMPLETE CBC W/AUTO DIFF WBC: CPT

## 2022-08-12 PROCEDURE — 80053 COMPREHEN METABOLIC PANEL: CPT

## 2022-08-12 PROCEDURE — 81001 URINALYSIS AUTO W/SCOPE: CPT

## 2022-08-12 PROCEDURE — 86780 TREPONEMA PALLIDUM: CPT

## 2022-08-12 PROCEDURE — 85384 FIBRINOGEN ACTIVITY: CPT

## 2022-08-12 PROCEDURE — 36415 COLL VENOUS BLD VENIPUNCTURE: CPT

## 2022-08-12 PROCEDURE — 86901 BLOOD TYPING SEROLOGIC RH(D): CPT

## 2022-08-12 PROCEDURE — U0005: CPT

## 2022-08-12 PROCEDURE — 84156 ASSAY OF PROTEIN URINE: CPT

## 2022-08-12 PROCEDURE — 83615 LACTATE (LD) (LDH) ENZYME: CPT

## 2022-08-12 PROCEDURE — 76815 OB US LIMITED FETUS(S): CPT

## 2022-08-12 PROCEDURE — 85730 THROMBOPLASTIN TIME PARTIAL: CPT

## 2022-08-12 PROCEDURE — 59050 FETAL MONITOR W/REPORT: CPT

## 2022-08-12 PROCEDURE — 84550 ASSAY OF BLOOD/URIC ACID: CPT

## 2022-08-12 PROCEDURE — 82570 ASSAY OF URINE CREATININE: CPT

## 2022-08-12 PROCEDURE — 59025 FETAL NON-STRESS TEST: CPT

## 2022-08-12 PROCEDURE — G0463: CPT

## 2022-08-12 PROCEDURE — 85014 HEMATOCRIT: CPT

## 2022-08-12 PROCEDURE — 85018 HEMOGLOBIN: CPT

## 2022-08-12 PROCEDURE — 85610 PROTHROMBIN TIME: CPT

## 2022-08-12 RX ADMIN — Medication 600 MILLIGRAM(S): at 05:21

## 2022-08-12 RX ADMIN — Medication 975 MILLIGRAM(S): at 02:33

## 2022-08-12 RX ADMIN — Medication 600 MILLIGRAM(S): at 05:00

## 2022-08-12 RX ADMIN — Medication 975 MILLIGRAM(S): at 02:36

## 2022-08-12 NOTE — PROGRESS NOTE ADULT - ASSESSMENT
BARBARA WHITE is a 31y  now PPD#2 s/p spontaneous vaginal delivery at [id number] gestation, uncomplicated.    A/P:    -Vital signs stable  -Hgb: 11.6 -> 10.4   -Voiding, tolerating PO, bowel function nml   -Advance care as tolerated   -Continue routine postpartum care and education  -Healthy male infant, circumcision done  -Dispo: Patient to be discharged when meeting all postpartum and postoperative milestones and pending attending approval. BARBARA WHITE is a 31y  now PPD#2 s/p spontaneous vaginal delivery at 39w2d gestation, uncomplicated.    A/P:    -Vital signs stable  -Hgb: 11.6 -> 10.4   -Voiding, tolerating PO, bowel function nml   -Advance care as tolerated   -Continue routine postpartum care and education  -Healthy male infant, circumcision done  -Dispo: Patient to be discharged when meeting all postpartum and postoperative milestones and pending attending approval.\    PGY4 Addendum: Subjective Hx, Physical Exam, & Laboratory results reviewed. I agree with the assessment and plan of care, as discussed above, and have edited as necessary.  CADY Fernández MD

## 2022-08-12 NOTE — PROGRESS NOTE ADULT - SUBJECTIVE AND OBJECTIVE BOX
BARBARA WHITE is a 31y  now PPD#2 s/p spontaneous vaginal delivery at [id number] gestation, uncomplicated.    S:    No acute events overnight.   The patient has no complaints.  Pain controlled with current treatment regimen.   She is ambulating without difficulty and tolerating PO.   + flatus/+BM/+ voiding   She endorses appropriate lochia, which is decreasing.   She is breastfeeding without difficulty.   She denies fevers, chills, nausea and vomiting.   She denies lightheadedness, dizziness, palpitations, chest pain, SOB, RUQ pain, blurry vision, new-onset swelling, and blurry vision.    O:    HR: 81 (22 @ 05:04) (81 - 81)  BP: 116/79 (22 @ 05:04) (116/79 - 116/79)  RR: 17 (22 @ 05:04) (17 - 17)  SpO2: 98% (22 @ 05:04) (98% - 98%)    Gen: NAD, AOx3  CV: RRR, S1/S2 present  Pulm: CTAB  Breast: Nontender, non-engorged   Abdomen:  Soft, non-tender, non-distended, +bowel sounds  Uterus:  Fundus firm below umbilicus  VE:  Expected lochia  Ext:  Non-tender and non-edematous                          10.4   x     )-----------( x        ( 11 Aug 2022 07:00 )             31.8            BARBARA WHITE is a 31y  now PPD#2 s/p spontaneous vaginal delivery at 39w2d gestation, uncomplicated.    S:    No acute events overnight.   The patient has no complaints.  Pain controlled with current treatment regimen.   She is ambulating without difficulty and tolerating PO.   + flatus/+BM/+ voiding   She endorses appropriate lochia, which is decreasing.   She is breastfeeding without difficulty.   She denies fevers, chills, nausea and vomiting.   She denies lightheadedness, dizziness, palpitations, chest pain, SOB, RUQ pain, blurry vision, new-onset swelling, and blurry vision.    O:    HR: 81 (22 @ 05:04) (81 - 81)  BP: 116/79 (22 @ 05:04) (116/79 - 116/79)  RR: 17 (22 @ 05:04) (17 - 17)  SpO2: 98% (22 @ 05:04) (98% - 98%)    Gen: NAD, AOx3  CV: RRR, S1/S2 present  Pulm: CTAB  Breast: Nontender, non-engorged   Abdomen:  Soft, non-tender, non-distended, +bowel sounds  Uterus:  Fundus firm below umbilicus  VE:  Expected lochia  Ext:  Non-tender and non-edematous                          10.4   x     )-----------( x        ( 11 Aug 2022 07:00 )             31.8

## 2022-08-15 PROBLEM — O14.90 UNSPECIFIED PRE-ECLAMPSIA, UNSPECIFIED TRIMESTER: Chronic | Status: ACTIVE | Noted: 2022-08-09

## 2022-08-15 PROBLEM — J45.909 UNSPECIFIED ASTHMA, UNCOMPLICATED: Chronic | Status: ACTIVE | Noted: 2022-08-09

## 2022-08-25 ENCOUNTER — APPOINTMENT (OUTPATIENT)
Dept: OBGYN | Facility: CLINIC | Age: 32
End: 2022-08-25

## 2022-08-25 VITALS
SYSTOLIC BLOOD PRESSURE: 108 MMHG | WEIGHT: 184 LBS | DIASTOLIC BLOOD PRESSURE: 64 MMHG | HEIGHT: 66 IN | BODY MASS INDEX: 29.57 KG/M2

## 2022-08-25 PROCEDURE — 0503F POSTPARTUM CARE VISIT: CPT

## 2022-08-25 NOTE — HISTORY OF PRESENT ILLNESS
[Delivery Date: ___] : on [unfilled] [] : delivered by vaginal delivery [Male] : Delivery History: baby boy [Wt. ___] : weighing [unfilled] [Breastfeeding] : currently nursing [None] : No associated symptoms are reported [Mild] : mild vaginal bleeding [Not Done] : Examination of breasts not done [Doing Well] : is doing well [FreeTextEntry8] : Postpartum visit. No complaints.  [de-identified] : exclusively [de-identified] : EPDS reviwed- score 5. PPD si/sx reviewed. Plan for PP visit.

## 2022-09-26 ENCOUNTER — APPOINTMENT (OUTPATIENT)
Dept: OBGYN | Facility: CLINIC | Age: 32
End: 2022-09-26

## 2022-09-26 VITALS
DIASTOLIC BLOOD PRESSURE: 70 MMHG | BODY MASS INDEX: 30.23 KG/M2 | WEIGHT: 188.13 LBS | SYSTOLIC BLOOD PRESSURE: 108 MMHG | HEIGHT: 66 IN

## 2022-09-26 DIAGNOSIS — N75.0 CYST OF BARTHOLIN'S GLAND: ICD-10-CM

## 2022-09-26 DIAGNOSIS — N76.0 ACUTE VAGINITIS: ICD-10-CM

## 2022-09-26 LAB
HCG UR QL: NEGATIVE
QUALITY CONTROL: YES

## 2022-09-26 PROCEDURE — 81025 URINE PREGNANCY TEST: CPT

## 2022-09-26 PROCEDURE — 99214 OFFICE O/P EST MOD 30 MIN: CPT | Mod: 24

## 2022-09-27 PROBLEM — N75.0 BARTHOLIN GLAND CYST: Status: ACTIVE | Noted: 2022-07-21

## 2022-09-27 NOTE — HISTORY OF PRESENT ILLNESS
[Postpartum Follow Up] : postpartum follow up [Last Pap Date: ___] : Last Pap Date: [unfilled] [] : delivered by vaginal delivery [Male] : Delivery History: baby boy [Breastfeeding] : currently nursing [Delivery Date: ___] : on [unfilled] [Wt. ___] : weighing [unfilled] [Complications:___] : no complications [Discharge HGB: ___] : hemoglobin level was [unfilled] [Back to Normal] : is back to normal in size [None] : no vaginal bleeding [Awake] : awake [Alert] : alert [Acute Distress] : no acute distress [Soft] : soft [Tender] : non tender [Distended] : not distended [Oriented x3] : oriented to person, place, and time [Normal] : uterus [Labia Majora] : labia major [Labia Minora] : labia minora [Discharge] : a  ~M vaginal discharge was present [No Bleeding] : there was no active vaginal bleeding [de-identified] : fundus Firm and NT; left labia 4 cm chronic bartholin cyst. Left 2 cm chronic gluteal cyst [FreeTextEntry1] : DELIVERY:\par \par 8/10/22  MALE. 7lb 0oz. Apgars 9 & 9. 39 wk  cc\par \par LABS:\par A positive, RI/RI,  \par 2022 neg pap\par Discharge hg 10.4\par \par #s/p  8/10/22\par -breast/bottle feeding; recommend MV or PNV\par -contraception options reviewed\par -considering [employer]; pamphlets given (menses 6 days, 2 days heavy)\par \par #PP depression\par -feels overwhelmed \par -EPDS 15; denies SI/HI\par -zoloft 25 mg script; ER precautions\par -discussed therapy; declines\par \par #acute vaginitis\par -affirm\par \par #Left bartholin cyst 4 cm (chronic)\par #left gluteal cyst (chronic)\par -recommend WLE and marsupialization; task sent\par \par rto 1-2 wk for IUD insert w/ US

## 2022-09-30 LAB
CANDIDA VAG CYTO: NOT DETECTED
G VAGINALIS+PREV SP MTYP VAG QL MICRO: NOT DETECTED
T VAGINALIS VAG QL WET PREP: NOT DETECTED

## 2022-10-12 ENCOUNTER — APPOINTMENT (OUTPATIENT)
Dept: ANTEPARTUM | Facility: CLINIC | Age: 32
End: 2022-10-12

## 2022-10-12 ENCOUNTER — APPOINTMENT (OUTPATIENT)
Dept: OBGYN | Facility: CLINIC | Age: 32
End: 2022-10-12

## 2022-10-31 ENCOUNTER — RX CHANGE (OUTPATIENT)
Age: 32
End: 2022-10-31

## 2022-10-31 RX ORDER — SERTRALINE 25 MG/1
25 TABLET, FILM COATED ORAL DAILY
Qty: 30 | Refills: 3 | Status: DISCONTINUED | COMMUNITY
Start: 2022-09-26 | End: 2022-10-31

## 2022-10-31 RX ORDER — SERTRALINE 25 MG/1
25 TABLET, FILM COATED ORAL
Qty: 90 | Refills: 2 | Status: ACTIVE | COMMUNITY
Start: 2022-10-31 | End: 1900-01-01

## 2022-11-07 ENCOUNTER — NON-APPOINTMENT (OUTPATIENT)
Age: 32
End: 2022-11-07

## 2023-02-13 ENCOUNTER — NON-APPOINTMENT (OUTPATIENT)
Age: 33
End: 2023-02-13

## 2023-03-28 ENCOUNTER — NON-APPOINTMENT (OUTPATIENT)
Age: 33
End: 2023-03-28

## 2023-07-18 ENCOUNTER — APPOINTMENT (OUTPATIENT)
Dept: OBGYN | Facility: CLINIC | Age: 33
End: 2023-07-18
Payer: COMMERCIAL

## 2023-07-18 VITALS
HEIGHT: 66 IN | BODY MASS INDEX: 26.52 KG/M2 | WEIGHT: 165 LBS | SYSTOLIC BLOOD PRESSURE: 116 MMHG | DIASTOLIC BLOOD PRESSURE: 64 MMHG

## 2023-07-18 DIAGNOSIS — N89.8 OTHER SPECIFIED NONINFLAMMATORY DISORDERS OF VAGINA: ICD-10-CM

## 2023-07-18 LAB
BILIRUB UR QL STRIP: NORMAL
GLUCOSE UR-MCNC: NORMAL
HCG UR QL: 0.2 EU/DL
HGB UR QL STRIP.AUTO: ABNORMAL
KETONES UR-MCNC: NORMAL
LEUKOCYTE ESTERASE UR QL STRIP: ABNORMAL
NITRITE UR QL STRIP: NORMAL
PH UR STRIP: 6.5
PROT UR STRIP-MCNC: NORMAL
SP GR UR STRIP: 1.01

## 2023-07-18 PROCEDURE — 99213 OFFICE O/P EST LOW 20 MIN: CPT

## 2023-07-18 PROCEDURE — 81003 URINALYSIS AUTO W/O SCOPE: CPT | Mod: QW

## 2023-07-18 RX ORDER — FLUCONAZOLE 150 MG/1
150 TABLET ORAL
Qty: 2 | Refills: 0 | Status: ACTIVE | COMMUNITY
Start: 2023-07-18 | End: 1900-01-01

## 2023-07-18 NOTE — PHYSICAL EXAM
[Appropriately responsive] : appropriately responsive [Alert] : alert [No Acute Distress] : no acute distress [Oriented x3] : oriented x3 [FreeTextEntry1] : pt declines pelvic exam today

## 2023-07-18 NOTE — DISCUSSION/SUMMARY
[FreeTextEntry1] : Pt declined pelvic exam today, pt self collected affirm culture, will f/u results. RX for fluconazole sent to pharmacy, reviewed instructions for use. We also discussed vulvar hygiene practices including gentle non scented soaps, changing out of wet bathing suits/sweaty clothing, wearing loose clothing. All questions answered.\par \par FU for annual as needed.

## 2023-07-19 ENCOUNTER — NON-APPOINTMENT (OUTPATIENT)
Age: 33
End: 2023-07-19

## 2023-07-19 DIAGNOSIS — N76.0 ACUTE VAGINITIS: ICD-10-CM

## 2023-07-19 DIAGNOSIS — B96.89 ACUTE VAGINITIS: ICD-10-CM

## 2023-07-19 LAB
CANDIDA VAG CYTO: DETECTED
G VAGINALIS+PREV SP MTYP VAG QL MICRO: DETECTED
T VAGINALIS VAG QL WET PREP: NOT DETECTED

## 2023-07-19 RX ORDER — METRONIDAZOLE 7.5 MG/G
0.75 GEL VAGINAL
Qty: 1 | Refills: 0 | Status: ACTIVE | COMMUNITY
Start: 2023-07-19 | End: 1900-01-01

## 2023-07-26 ENCOUNTER — APPOINTMENT (OUTPATIENT)
Dept: OBGYN | Facility: CLINIC | Age: 33
End: 2023-07-26

## 2023-10-20 NOTE — OB RN PATIENT PROFILE - DOES PATIENT HAVE ADVANCE DIRECTIVE
Pt. reports left flank/back pain since July.  Denies dysuria. States chiropractor noticed swelling in area today.   No